# Patient Record
Sex: FEMALE | Race: WHITE | NOT HISPANIC OR LATINO | Employment: UNEMPLOYED | URBAN - METROPOLITAN AREA
[De-identification: names, ages, dates, MRNs, and addresses within clinical notes are randomized per-mention and may not be internally consistent; named-entity substitution may affect disease eponyms.]

---

## 2017-01-23 ENCOUNTER — HOSPITAL ENCOUNTER (EMERGENCY)
Facility: HOSPITAL | Age: 37
Discharge: HOME | End: 2017-01-23
Attending: EMERGENCY MEDICINE

## 2017-01-23 VITALS
SYSTOLIC BLOOD PRESSURE: 113 MMHG | WEIGHT: 223.38 LBS | HEART RATE: 88 BPM | BODY MASS INDEX: 45.03 KG/M2 | HEIGHT: 59 IN | RESPIRATION RATE: 16 BRPM | DIASTOLIC BLOOD PRESSURE: 72 MMHG | OXYGEN SATURATION: 97 % | TEMPERATURE: 99 F

## 2017-01-23 DIAGNOSIS — R10.9 ABDOMINAL PAIN: ICD-10-CM

## 2017-01-23 DIAGNOSIS — Z3A.10 10 WEEKS GESTATION OF PREGNANCY: Primary | ICD-10-CM

## 2017-01-23 LAB
ALBUMIN SERPL BCP-MCNC: 3.7 G/DL
ALP SERPL-CCNC: 62 U/L
ALT SERPL W/O P-5'-P-CCNC: 16 U/L
ANION GAP SERPL CALC-SCNC: 14 MMOL/L
AST SERPL-CCNC: 28 U/L
B-HCG UR QL: POSITIVE
BASOPHILS # BLD AUTO: 0.02 K/UL
BASOPHILS NFR BLD: 0.2 %
BILIRUB SERPL-MCNC: 0.5 MG/DL
BILIRUB UR QL STRIP: NEGATIVE
BUN SERPL-MCNC: 8 MG/DL
CALCIUM SERPL-MCNC: 9.6 MG/DL
CHLORIDE SERPL-SCNC: 104 MMOL/L
CLARITY UR REFRACT.AUTO: CLEAR
CO2 SERPL-SCNC: 19 MMOL/L
COLOR UR AUTO: YELLOW
CREAT SERPL-MCNC: 0.7 MG/DL
DIFFERENTIAL METHOD: ABNORMAL
EOSINOPHIL # BLD AUTO: 0.1 K/UL
EOSINOPHIL NFR BLD: 0.9 %
ERYTHROCYTE [DISTWIDTH] IN BLOOD BY AUTOMATED COUNT: 13.5 %
EST. GFR  (AFRICAN AMERICAN): >60 ML/MIN/1.73 M^2
EST. GFR  (NON AFRICAN AMERICAN): >60 ML/MIN/1.73 M^2
GLUCOSE SERPL-MCNC: 91 MG/DL
GLUCOSE UR QL STRIP: NEGATIVE
HCT VFR BLD AUTO: 39.9 %
HGB BLD-MCNC: 14 G/DL
HGB UR QL STRIP: NEGATIVE
KETONES UR QL STRIP: ABNORMAL
LEUKOCYTE ESTERASE UR QL STRIP: NEGATIVE
LYMPHOCYTES # BLD AUTO: 1.7 K/UL
LYMPHOCYTES NFR BLD: 14.1 %
MCH RBC QN AUTO: 29 PG
MCHC RBC AUTO-ENTMCNC: 35.1 %
MCV RBC AUTO: 83 FL
MONOCYTES # BLD AUTO: 0.9 K/UL
MONOCYTES NFR BLD: 7.6 %
NEUTROPHILS # BLD AUTO: 9.5 K/UL
NEUTROPHILS NFR BLD: 76.7 %
NITRITE UR QL STRIP: NEGATIVE
PH UR STRIP: 6 [PH] (ref 5–8)
PLATELET # BLD AUTO: 346 K/UL
PMV BLD AUTO: 9.9 FL
POTASSIUM SERPL-SCNC: 4.5 MMOL/L
PROT SERPL-MCNC: 8.3 G/DL
PROT UR QL STRIP: NEGATIVE
RBC # BLD AUTO: 4.83 M/UL
SODIUM SERPL-SCNC: 137 MMOL/L
SP GR UR STRIP: <=1.005 (ref 1–1.03)
URN SPEC COLLECT METH UR: ABNORMAL
UROBILINOGEN UR STRIP-ACNC: NEGATIVE EU/DL
WBC # BLD AUTO: 12.3 K/UL

## 2017-01-23 PROCEDURE — 81025 URINE PREGNANCY TEST: CPT

## 2017-01-23 PROCEDURE — 25000003 PHARM REV CODE 250: Performed by: PHYSICIAN ASSISTANT

## 2017-01-23 PROCEDURE — 81003 URINALYSIS AUTO W/O SCOPE: CPT

## 2017-01-23 PROCEDURE — 80053 COMPREHEN METABOLIC PANEL: CPT

## 2017-01-23 PROCEDURE — 85025 COMPLETE CBC W/AUTO DIFF WBC: CPT

## 2017-01-23 PROCEDURE — 99284 EMERGENCY DEPT VISIT MOD MDM: CPT

## 2017-01-23 RX ORDER — ACETAMINOPHEN 325 MG/1
975 TABLET ORAL
Status: COMPLETED | OUTPATIENT
Start: 2017-01-23 | End: 2017-01-23

## 2017-01-23 RX ADMIN — ACETAMINOPHEN 975 MG: 325 TABLET ORAL at 07:01

## 2017-01-23 NOTE — ED AVS SNAPSHOT
[facility]  [address] [address] [address]               April Dafne   2017  4:35 PM   ED    Description:  Female : 1980   Department:  [facility]           Your Care was Coordinated By:     Provider Role From To    Joseph Yun MD Attending Provider 17 1631 17 1640    KARISSA Vazquez Physician Assistant 17 1640 --      Reason for Visit     Abdominal Pain           Diagnoses this Visit        Comments    10 weeks gestation of pregnancy    -  Primary     Abdominal pain           ED Disposition     None           To Do List           Follow-up Information     Follow up with Von Rios MD In 2 days.    Specialty:  Pediatrics    Why:  As needed, If symptoms worsen return to ED     Contact information:    [address] [facility] [address]  [phone]        [facility] On Call     [facility] On Call Nurse Care Line -  Assistance  Registered nurses in the [facility] provide clinical advisement, health education, appointment booking, and other advisory services.  Call for this free service at [phone].             Medications           Message regarding Medications     Verify the changes and/or additions to your medication regime listed below are the same as discussed with your clinician today.  If any of these changes or additions are incorrect, please notify your healthcare provider.        These medications were administered today        Dose Freq    acetaminophen tablet 975 mg 975 mg ED 1 Time    Sig: Take 3 tablets (975 mg total) by mouth ED 1 Time.    Class: Normal    Route: Oral    Cosign for Ordering: Required by [facility]       STOP taking these medications     PNV #10-iron [id number] 30-1-310.1 mg Cap Take 1 capsule by mouth once daily.    oxycodone-acetaminophen (PERCOCET)  mg per tablet Take 1 tablet by mouth every 4 (four) hours  "as needed.    nicotine (NICODERM CQ) 21 mg/24 hr Place 1 patch onto the skin once daily.    naproxen (NAPROSYN) 500 MG tablet Take 1 tablet (500 mg total) by mouth every 8 (eight) hours as needed (cramping or mild pain 1-3/10 scale).           Verify that the below list of medications is an accurate representation of the medications you are currently taking.  If none reported, the list may be blank. If incorrect, please contact your healthcare provider. Carry this list with you in case of emergency.           Current Medications            Clinical Reference Information           Your Vitals Were     BP Pulse Temp Resp Height Weight    115/67 (BP Location: Left arm, Patient Position: Sitting, BP Method: Automatic) 91 99 °F (37.2 °C) (Oral) 18 4' 11" (1.499 m) 101.3 kg (223 lb 6.4 oz)    Last Period SpO2 BMI          10/10/2016 (Exact Date) 100% 45.12 kg/m2        Allergies as of 1/23/2017     No Known Allergies      Immunizations Administered on Date of Encounter - 1/23/2017     None      ED Micro, Lab, POCT     Start Ordered       Status Ordering Provider    01/23/17 1656 01/23/17 1655  Urinalysis  STAT      Final result     01/23/17 1656 01/23/17 1655  Pregnancy, urine rapid (UPT)  STAT      Final result     01/23/17 1656 01/23/17 1655  CBC auto differential  STAT      Final result     01/23/17 1656 01/23/17 1655  Comprehensive metabolic panel  STAT      Final result       ED Imaging Orders     Start Ordered       Status Ordering Provider    01/23/17 1655 01/23/17 1655  US Abdomen Complete  1 time imaging      Final result         Discharge Instructions         Abdominal Pain and Early Pregnancy    (To rule out ectopic pregnancy or miscarriage)  Our tests show that you are pregnant, but the exact cause of your pain isnt clear.  Some pain and bleeding are common early in pregnancy. Often they stop, and you can go on to have a normal pregnancy and baby. Other times the pain or bleeding can be signs of " a miscarriage or ectopic pregnancy. An ectopic pregnancy is a very serious problem. At this time it is unclear if your pregnancy will continue normally, if you will have a miscarriage, or if you could have an ectopic pregnancy. Below is some information about this.  Miscarriage  At this time we dont know whether you will have a miscarriage, or if things will clear up and your pregnancy will continue normally. We understand that this is emotionally difficult. There is little we can say to change the way you feel. But understand that miscarriages are common.  About 1 or 2 out of every 10 pregnancies end this way. Some end even before you know you are pregnant. This happens for a number of reasons, and usually we never figure out why. Its important you know that it is not your fault. It didnt happen because you did anything wrong.  Having sex or exercising does not cause a miscarriage. These activities are usually safe unless you have pain or bleeding or your doctor tells you to stop. Even minor falls wont cause a miscarriage. Miscarriages happen because things were not developing as they were supposed to. No medicine can prevent a miscarriage.  Ectopic pregnancy  In a normal pregnancy, the fertilized egg attaches to the wall of the womb (uterus). In an ectopic or tubal pregnancy, the fertilized egg attaches outside the uterus, usually in the fallopian tube. Very rarely, the egg attaches to an ovary or somewhere else in the abdomen. An ectopic pregnancy is much less common than a miscarriage, but it is very serious. The baby cannot survive, and as it grows it can rupture the tube. This can cause internal bleeding and even death. Risk factors for an ectopic are:  · An ectopic pregnancy in the past  · Pelvic inflammatory disease, or PID  · Endometriosis  · Smoking  · An IUD  Additional tests  Because we dont know whats causing your symptoms, you will need more tests to help your doctor figure out what the problem  is. You may need:  Ultrasound  An ultrasound can usually find a normal pregnancy as early as 4 to 5 weeks along. If the ultrasound does not show the baby inside the uterus, it means that:  · You have a normal pregnancy less than 4 weeks along, or  · You are having or recently had a miscarriage, or  · You have an ectopic pregnancy  Quantitative HCG  This test measures the amount of a pregnancy hormone in your blood. Comparing today's test result to a repeat test in 2 days will show whether you have a normal pregnancy.  Laparoscopy  This is a type of surgery. The doctor will put a tube with a light inside your belly (abdomen) to look directly at your pelvic organs. This test is used when it is not safe to wait 2 days for blood test results.     Important information  If you do have an ectopic pregnancy, there is a small chance that the growing fetus can tear the fallopian tube. This can cause severe internal bleeding. If this happens, you may have:  · Sudden severe pain in your lower abdomen  · Vaginal bleeding  · Weakness, dizziness, and sometimes fainting  If any of these symptoms occur:  · Call 911 or return immediately to the hospital.  · Do not drive yourself.  · Do not go to your doctor's office or to a clinic - go to the hospital.      Home care  Follow these guidelines to help care for yourself at home:  · Rest until your next exam. Dont do anything strenuous.  · Eat a light diet with foods that are easy to digest.  · Dont have sex until your doctor says its OK.  Follow-up care  Follow up with your doctor for repeat blood testing. If you were told to have a repeat blood test in 2 days, its important to get it done.  If you had an X-ray or ultrasound, a radiologist will review it. You will be told of any new findings that may affect your care.  Call 911  Call 911 if you have any of these:  · Severe pain and very heavy bleeding  · Severe lightheadedness, passing out, or fainting  · Rapid heart  rate  · Trouble breathing  · Confused or difficulty waking up  When to seek medical advice  Call your health care provider right away  if any of these occur:  · The pain in your abdomen gets worse, either suddenly or gradually.  · You are dizzy or weak when you stand.  · You have heavy vaginal bleeding. This means soaking 1 pad an hour for 3 hours.  · You have vaginal bleeding for more than 5 days.  · You have repeated vomiting or diarrhea.  · The pain in your abdomen moves to the lower right.  · You have blood in your vomit or bowel movements. This will be dark red or black.  · You have a fever of 100.4ºF (38ºC) or higher, or as directed by your health care provider  © 5369-0221 [employer]. [address] [place] [address], [place] [address]. All rights reserved. This information is not intended as a substitute for professional medical care. Always follow your healthcare professional's instructions.          [facility] Sign-Up     Activating your [facility] account is as easy as 1-2-3!     1) Visit [email or web address], select Sign Up Now, enter this activation code and your date of birth, then select Next.  Activation code not generated  Current Patient Portal Status: Account disabled      2) Create a username and password to use when you visit [facility] in the future and select a security question in case you lose your password and select Next.    3) Enter your e-mail address and click Sign Up!    Additional Information  If you have questions, please e-mail [email or web address] or call [phone] to talk to our [facility] staff. Remember, [facility] is NOT to be used for urgent needs. For medical emergencies, dial 911.         Smoking Cessation     If you would like to quit smoking:   You may be eligible for free services if you are a [place] resident and started smoking cigarettes before September 1, 1988.  Call the Smoking Cessation Trust (SCT) toll free at (917) 219-2517 or (921) 850-6973.   Call  1-800-QUIT-NOW if you do not meet the above criteria.             [facility] complies with applicable Federal civil rights laws and does not discriminate on the basis of race, color, national origin, age, disability, or sex.        Language Assistance Services     ATTENTION: Language assistance services are available, free of charge. Please call [phone].      ATENCIÓN: Si habla español, tiene a modi disposición servicios gratuitos de asistencia lingüística. Llame al [phone].     CHÚ Ý: N?u b?n nói Ti?ng Vi?t, có các d?ch v? h? tr? ngôn ng? mi?n phí dành cho b?n. G?i s? [phone].

## 2017-01-23 NOTE — ED PROVIDER NOTES
Encounter Date: 2017       History     Chief Complaint   Patient presents with    Abdominal Pain     x2 days ago. Pt states she is 15 wks preg. SONA 17. Started having pain/pressure. 8th pregnancy. States she's never felt this type of pain. HX 7 C sections, excessive scar tissue. Denies bleeding/discharge. Has not yet seen OBGYN.      Review of patient's allergies indicates:  No Known Allergies  HPI Comments: Pt reports to ED c/o abdominal pain. Pt reports pain has been ongoing for 2 days, but has gotten increasingly worse. Pt is currently 15 weeks pregnant and has not had any prenatal care d/t her insurance.  Pt is A0 and all of her previous pregnancies resulted in C-sections. 16 months ago, following the birth of her last child, pt had some complications immediately after being discharged from [facility] and was found to have an incarcerated ventral hernia, which was repaired with a pelvic mesh.  Pt says she has never had pain like this before with any of her pregnancies.  Pain is constant with no alleviating factors, but is exacerbated with any movement. Pain is located to lower abdomen and radiates to epigastric area.  Pt says pain feels like contractions. Pt denies any bleeding, vaginal pain, dysuria, back pain, CP, SOB, fever, nausea, vomiting, or any other associated sxs.  Pt's LBM was this morning and pt denies any bloody or tar like stool.      The history is provided by the patient.     Past Medical History   Diagnosis Date    Seizures      No past medical history pertinent negatives.  Past Surgical History   Procedure Laterality Date     section       x 6     section      Tonsillectomy      Hernia repair       History reviewed. No pertinent family history.  Social History   Substance Use Topics    Smoking status: Current Every Day Smoker     Packs/day: 0.50     Types: Cigarettes    Smokeless tobacco: Never Used    Alcohol use No     Review of Systems   Constitutional:  Negative for fever.   HENT: Negative for sore throat.    Respiratory: Negative for shortness of breath.    Cardiovascular: Negative for chest pain.   Gastrointestinal: Negative for anal bleeding, constipation, diarrhea, nausea and vomiting.   Genitourinary: Negative for decreased urine volume, dysuria, flank pain, pelvic pain, vaginal bleeding, vaginal discharge and vaginal pain.   Musculoskeletal: Negative for back pain.   Skin: Negative for rash.   Neurological: Negative for weakness.   Hematological: Does not bruise/bleed easily.   All other systems reviewed and are negative.      Physical Exam   Initial Vitals   BP Pulse Resp Temp SpO2   01/23/17 1619 01/23/17 1619 01/23/17 1619 01/23/17 1619 01/23/17 1619   109/64 97 18 97.7 °F (36.5 °C) 98 %     Physical Exam    Nursing note and vitals reviewed.  Constitutional: She appears well-developed and well-nourished.   HENT:   Head: Normocephalic and atraumatic.   Eyes: EOM are normal. Pupils are equal, round, and reactive to light.   Neck: Normal range of motion. Neck supple.   Cardiovascular: Normal rate and regular rhythm.   Pulmonary/Chest: Breath sounds normal.   Abdominal: Soft. She exhibits no distension. There is tenderness. There is no rebound and no guarding.   Musculoskeletal: Normal range of motion.   Neurological: She is alert and oriented to person, place, and time.   Skin:   Significant scaring to lower abdomen from previous c-sections and surgical repair of hernia.          ED Course   Procedures  Labs Reviewed   URINALYSIS - Abnormal; Notable for the following:        Result Value    Ketones, UA Trace (*)     All other components within normal limits   CBC W/ AUTO DIFFERENTIAL - Abnormal; Notable for the following:     Gran # 9.5 (*)     Gran% 76.7 (*)     Lymph% 14.1 (*)     All other components within normal limits   COMPREHENSIVE METABOLIC PANEL - Abnormal; Notable for the following:     CO2 19 (*)     All other components within normal limits    PREGNANCY TEST, URINE RAPID        Imaging Results         US Abdomen Complete (Final result) Result time:  01/23/17 19:12:14    Final result by Yane Souza MD (Timothy) (01/23/17 19:12:14)    Impression:     Viable intrauterine pregnancy with estimated sonographic age of 10 weeks and 4 days.  Fetal heart rate 163 beats per minute.    No abnormalities of the upper abdomen.      Electronically signed by: YANE SOUZA MD  Date:     01/23/17  Time:    19:12     Narrative:    Ultrasound Abdomen Complete with doppler.    Clinical History:     <Abdominal pain.>    Findings:     The liver, spleen, and pancreas are normal. <There is normal hepatopetal flow by spectral doppler and color flow vascular ultrasound.>     The kidneys appear normal.  No hydronephrosis.    The gallbladder and common bile duct appears normal. The common bile duct measures 3 mm.    Visualized portions of the aorta and inferior vena cava appear normal.    There is an intrauterine pregnancy identified.  Crown-rump length of 3.3 cm corresponding to gestational age of 10 weeks and 4 days.  Fetal heart rate 163 beats per minute.            Results for orders placed or performed during the hospital encounter of 01/23/17   Urinalysis   Result Value Ref Range    Specimen UA Urine, Clean Catch     Color, UA Yellow Yellow, Straw, Emilee    Appearance, UA Clear Clear    pH, UA 6.0 5.0 - 8.0    Specific Gravity, UA <=1.005 1.005 - 1.030    Protein, UA Negative Negative    Glucose, UA Negative Negative    Ketones, UA Trace (A) Negative    Bilirubin (UA) Negative Negative    Occult Blood UA Negative Negative    Nitrite, UA Negative Negative    Urobilinogen, UA Negative <2.0 EU/dL    Leukocytes, UA Negative Negative   Pregnancy, urine rapid (UPT)   Result Value Ref Range    Preg Test, Ur Positive    CBC auto differential   Result Value Ref Range    WBC 12.30 3.90 - 12.70 K/uL    RBC 4.83 4.00 - 5.40 M/uL    Hemoglobin 14.0 12.0 - 16.0 g/dL    Hematocrit 39.9  37.0 - 48.5 %    MCV 83 82 - 98 fL    MCH 29.0 27.0 - 31.0 pg    MCHC 35.1 32.0 - 36.0 %    RDW 13.5 11.5 - 14.5 %    Platelets 346 150 - 350 K/uL    MPV 9.9 9.2 - 12.9 fL    Gran # 9.5 (H) 1.8 - 7.7 K/uL    Lymph # 1.7 1.0 - 4.8 K/uL    Mono # 0.9 0.3 - 1.0 K/uL    Eos # 0.1 0.0 - 0.5 K/uL    Baso # 0.02 0.00 - 0.20 K/uL    Gran% 76.7 (H) 38.0 - 73.0 %    Lymph% 14.1 (L) 18.0 - 48.0 %    Mono% 7.6 4.0 - 15.0 %    Eosinophil% 0.9 0.0 - 8.0 %    Basophil% 0.2 0.0 - 1.9 %    Differential Method Automated    Comprehensive metabolic panel   Result Value Ref Range    Sodium 137 136 - 145 mmol/L    Potassium 4.5 3.5 - 5.1 mmol/L    Chloride 104 95 - 110 mmol/L    CO2 19 (L) 23 - 29 mmol/L    Glucose 91 70 - 110 mg/dL    BUN, Bld 8 6 - 20 mg/dL    Creatinine 0.7 0.5 - 1.4 mg/dL    Calcium 9.6 8.7 - 10.5 mg/dL    Total Protein 8.3 6.0 - 8.4 g/dL    Albumin 3.7 3.5 - 5.2 g/dL    Total Bilirubin 0.5 0.1 - 1.0 mg/dL    Alkaline Phosphatase 62 55 - 135 U/L    AST 28 10 - 40 U/L    ALT 16 10 - 44 U/L    Anion Gap 14 8 - 16 mmol/L    eGFR if African American >60.0 >60 mL/min/1.73 m^2    eGFR if non African American >60.0 >60 mL/min/1.73 m^2                            ED Course     Clinical Impression:   The primary encounter diagnosis was 10 weeks gestation of pregnancy. A diagnosis of Abdominal pain was also pertinent to this visit.          KARISSA Vazquez  01/23/17 2023

## 2017-01-24 NOTE — ED NOTES
Pt given test results and poc by KARISSA Palacios and myself. She has verbalized understanding. Pt denies having any further questions or concerns at this time. Pt will be discharged at this time per md order.

## 2017-01-24 NOTE — DISCHARGE INSTRUCTIONS
Abdominal Pain and Early Pregnancy    (To rule out ectopic pregnancy or miscarriage)  Our tests show that you are pregnant, but the exact cause of your pain isnt clear.  Some pain and bleeding are common early in pregnancy. Often they stop, and you can go on to have a normal pregnancy and baby. Other times the pain or bleeding can be signs of a miscarriage or ectopic pregnancy. An ectopic pregnancy is a very serious problem. At this time it is unclear if your pregnancy will continue normally, if you will have a miscarriage, or if you could have an ectopic pregnancy. Below is some information about this.  Miscarriage  At this time we dont know whether you will have a miscarriage, or if things will clear up and your pregnancy will continue normally. We understand that this is emotionally difficult. There is little we can say to change the way you feel. But understand that miscarriages are common.  About 1 or 2 out of every 10 pregnancies end this way. Some end even before you know you are pregnant. This happens for a number of reasons, and usually we never figure out why. Its important you know that it is not your fault. It didnt happen because you did anything wrong.  Having sex or exercising does not cause a miscarriage. These activities are usually safe unless you have pain or bleeding or your doctor tells you to stop. Even minor falls wont cause a miscarriage. Miscarriages happen because things were not developing as they were supposed to. No medicine can prevent a miscarriage.  Ectopic pregnancy  In a normal pregnancy, the fertilized egg attaches to the wall of the womb (uterus). In an ectopic or tubal pregnancy, the fertilized egg attaches outside the uterus, usually in the fallopian tube. Very rarely, the egg attaches to an ovary or somewhere else in the abdomen. An ectopic pregnancy is much less common than a miscarriage, but it is very serious. The baby cannot survive, and as it grows it can rupture  the tube. This can cause internal bleeding and even death. Risk factors for an ectopic are:  · An ectopic pregnancy in the past  · Pelvic inflammatory disease, or PID  · Endometriosis  · Smoking  · An IUD  Additional tests  Because we dont know whats causing your symptoms, you will need more tests to help your doctor figure out what the problem is. You may need:  Ultrasound  An ultrasound can usually find a normal pregnancy as early as 4 to 5 weeks along. If the ultrasound does not show the baby inside the uterus, it means that:  · You have a normal pregnancy less than 4 weeks along, or  · You are having or recently had a miscarriage, or  · You have an ectopic pregnancy  Quantitative HCG  This test measures the amount of a pregnancy hormone in your blood. Comparing today's test result to a repeat test in 2 days will show whether you have a normal pregnancy.  Laparoscopy  This is a type of surgery. The doctor will put a tube with a light inside your belly (abdomen) to look directly at your pelvic organs. This test is used when it is not safe to wait 2 days for blood test results.     Important information  If you do have an ectopic pregnancy, there is a small chance that the growing fetus can tear the fallopian tube. This can cause severe internal bleeding. If this happens, you may have:  · Sudden severe pain in your lower abdomen  · Vaginal bleeding  · Weakness, dizziness, and sometimes fainting  If any of these symptoms occur:  · Call 911 or return immediately to the hospital.  · Do not drive yourself.  · Do not go to your doctor's office or to a clinic - go to the hospital.      Home care  Follow these guidelines to help care for yourself at home:  · Rest until your next exam. Dont do anything strenuous.  · Eat a light diet with foods that are easy to digest.  · Dont have sex until your doctor says its OK.  Follow-up care  Follow up with your doctor for repeat blood testing. If you were told to have a repeat  blood test in 2 days, its important to get it done.  If you had an X-ray or ultrasound, a radiologist will review it. You will be told of any new findings that may affect your care.  Call 911  Call 911 if you have any of these:  · Severe pain and very heavy bleeding  · Severe lightheadedness, passing out, or fainting  · Rapid heart rate  · Trouble breathing  · Confused or difficulty waking up  When to seek medical advice  Call your health care provider right away  if any of these occur:  · The pain in your abdomen gets worse, either suddenly or gradually.  · You are dizzy or weak when you stand.  · You have heavy vaginal bleeding. This means soaking 1 pad an hour for 3 hours.  · You have vaginal bleeding for more than 5 days.  · You have repeated vomiting or diarrhea.  · The pain in your abdomen moves to the lower right.  · You have blood in your vomit or bowel movements. This will be dark red or black.  · You have a fever of 100.4ºF (38ºC) or higher, or as directed by your health care provider  © 8149-1486 [email or web address]. [address] [place] [address], [place] [address]. All rights reserved. This information is not intended as a substitute for professional medical care. Always follow your healthcare professional's instructions.

## 2017-01-24 NOTE — ED NOTES
"Pt is sitting up on ER stretcher, aaoX4, resp e/u, rates pain 10, VSS, nad. Pt came to ER for lower abdominal pain radiating to the epigastric region that began approximately 2 days ago, and she states that the pain is constantly increasing. Pt is currently approximately 15 weeks pregnant and has not had any prenatal care. Pt is A0.  Pt states that all of her previous pregnancies resulted in C-sections. After the birth of her last child 16 months ago, pt had an incarcerated ventral hernia after being discharged from L&D. Pt's hernia was repaired with a pelvic mesh. Pt denies ever having pain similar to this episodes and describes pain as "feeling like contractions." Pt denies any vaginal bleeding, discharge, or any other symptoms at this time. Pt updated on poc and has verbalized understanding. Bed locked in lowest position, side rails up X2, call bell in reach. Will continue to monitor.   "

## 2017-02-05 ENCOUNTER — HOSPITAL ENCOUNTER (EMERGENCY)
Facility: HOSPITAL | Age: 37
Discharge: HOME | End: 2017-02-05
Attending: INTERNAL MEDICINE

## 2017-02-05 VITALS
RESPIRATION RATE: 19 BRPM | SYSTOLIC BLOOD PRESSURE: 114 MMHG | HEIGHT: 59 IN | DIASTOLIC BLOOD PRESSURE: 79 MMHG | BODY MASS INDEX: 46.24 KG/M2 | WEIGHT: 229.38 LBS | TEMPERATURE: 99 F | OXYGEN SATURATION: 97 % | HEART RATE: 95 BPM

## 2017-02-05 DIAGNOSIS — O20.0 THREATENED MISCARRIAGE: Primary | ICD-10-CM

## 2017-02-05 LAB
ABO + RH BLD: NORMAL
BASOPHILS # BLD AUTO: 0.02 K/UL
BASOPHILS NFR BLD: 0.2 %
BLD GP AB SCN CELLS X3 SERPL QL: NORMAL
DIFFERENTIAL METHOD: NORMAL
EOSINOPHIL # BLD AUTO: 0.3 K/UL
EOSINOPHIL NFR BLD: 4 %
ERYTHROCYTE [DISTWIDTH] IN BLOOD BY AUTOMATED COUNT: 13.6 %
HCG INTACT+B SERPL-ACNC: NORMAL MIU/ML
HCG INTACT+B SERPL-ACNC: NORMAL MIU/ML
HCT VFR BLD AUTO: 38.5 %
HGB BLD-MCNC: 13.3 G/DL
LYMPHOCYTES # BLD AUTO: 1.7 K/UL
LYMPHOCYTES NFR BLD: 20.6 %
MCH RBC QN AUTO: 28.7 PG
MCHC RBC AUTO-ENTMCNC: 34.5 %
MCV RBC AUTO: 83 FL
MONOCYTES # BLD AUTO: 0.6 K/UL
MONOCYTES NFR BLD: 7.1 %
NEUTROPHILS # BLD AUTO: 5.7 K/UL
NEUTROPHILS NFR BLD: 67.4 %
PLATELET # BLD AUTO: 289 K/UL
PMV BLD AUTO: 9.5 FL
RBC # BLD AUTO: 4.63 M/UL
WBC # BLD AUTO: 8.44 K/UL

## 2017-02-05 PROCEDURE — 85025 COMPLETE CBC W/AUTO DIFF WBC: CPT

## 2017-02-05 PROCEDURE — 86900 BLOOD TYPING SEROLOGIC ABO: CPT

## 2017-02-05 PROCEDURE — 84702 CHORIONIC GONADOTROPIN TEST: CPT

## 2017-02-05 PROCEDURE — 99284 EMERGENCY DEPT VISIT MOD MDM: CPT

## 2017-02-05 PROCEDURE — 86901 BLOOD TYPING SEROLOGIC RH(D): CPT

## 2017-02-05 RX ORDER — ACETAMINOPHEN 500 MG
1000 TABLET ORAL 3 TIMES DAILY PRN
Qty: 30 TABLET | Refills: 0 | Status: ON HOLD
Start: 2017-02-05 | End: 2018-07-04 | Stop reason: HOSPADM

## 2017-02-05 NOTE — ED PROVIDER NOTES
Encounter Date: 2017       History   Pt presents with some vaginal spotting today, states also saw a small blood clot. Pt is 12 weeks pregnant, no prenatal care. . States some discomfort on her RLQ. Denies fever, nausea, diarrhea, or recent trauma.  Chief Complaint   Patient presents with    Vaginal Bleeding     12 weeks preg. spotting. pt denies pain at this time     Review of patient's allergies indicates:  No Known Allergies  The history is provided by the patient.     Past Medical History   Diagnosis Date    Seizures      No past medical history pertinent negatives.  Past Surgical History   Procedure Laterality Date     section       x 6     section      Tonsillectomy      Hernia repair       History reviewed. No pertinent family history.  Social History   Substance Use Topics    Smoking status: Current Every Day Smoker     Packs/day: 0.50     Types: Cigarettes    Smokeless tobacco: Never Used    Alcohol use No     Review of Systems   Constitutional: Negative for appetite change, chills and fever.   HENT: Negative for dental problem, ear pain and sore throat.    Eyes: Negative for visual disturbance.   Respiratory: Negative for cough and shortness of breath.    Cardiovascular: Negative for chest pain and palpitations.   Gastrointestinal: Negative for abdominal pain, blood in stool, diarrhea and vomiting.   Genitourinary: Positive for pelvic pain and vaginal bleeding. Negative for dysuria and vaginal discharge.   Musculoskeletal: Negative for back pain and myalgias.   Skin: Negative for rash.   Neurological: Negative for weakness and headaches.   Psychiatric/Behavioral: Negative for confusion and sleep disturbance.   All other systems reviewed and are negative.      Physical Exam   Initial Vitals   BP Pulse Resp Temp SpO2   17 1004 17 1004 17 1004 17 1004 17 1004   114/79 95 19 98.6 °F (37 °C) 97 %     Physical Exam    Nursing note and vitals  reviewed.  Constitutional: She appears well-developed and well-nourished. No distress.   HENT:   Head: Normocephalic and atraumatic.   Mouth/Throat: Oropharynx is clear and moist.   Eyes: Conjunctivae are normal. Pupils are equal, round, and reactive to light.   Neck: Normal range of motion. Neck supple.   Cardiovascular: Normal rate, regular rhythm, normal heart sounds and intact distal pulses.   Pulmonary/Chest: Breath sounds normal.   Abdominal: Soft. Bowel sounds are normal. She exhibits no distension. There is no tenderness. There is no rebound and no guarding.   Genitourinary: Vagina normal.   Genitourinary Comments: No blood from os, closed cervix, no discharge   Musculoskeletal: Normal range of motion. She exhibits no edema.   Neurological: She is alert and oriented to person, place, and time. She has normal strength.   Skin: Skin is warm and dry. No rash noted.   Psychiatric: Her behavior is normal.         ED Course   US - ED Performed - OB Limited 1 or More Gestations  Date/Time: 2/5/2017 11:31 AM  Performed by: CEE LEIGH.  Authorized by: CEE LEIGH.   Comments: Bedside US performed by me reveal an IUP with adequate fetal heart flickering and fetal movement; No free fluid        Labs Reviewed   CBC W/ AUTO DIFFERENTIAL   HCG, QUANTITATIVE, PREGNANCY   HCG, QUANTITATIVE, PREGNANCY   TYPE & SCREEN         Results for orders placed or performed during the hospital encounter of 02/05/17   CBC auto differential   Result Value Ref Range    WBC 8.44 3.90 - 12.70 K/uL    RBC 4.63 4.00 - 5.40 M/uL    Hemoglobin 13.3 12.0 - 16.0 g/dL    Hematocrit 38.5 37.0 - 48.5 %    MCV 83 82 - 98 fL    MCH 28.7 27.0 - 31.0 pg    MCHC 34.5 32.0 - 36.0 %    RDW 13.6 11.5 - 14.5 %    Platelets 289 150 - 350 K/uL    MPV 9.5 9.2 - 12.9 fL    Gran # 5.7 1.8 - 7.7 K/uL    Lymph # 1.7 1.0 - 4.8 K/uL    Mono # 0.6 0.3 - 1.0 K/uL    Eos # 0.3 0.0 - 0.5 K/uL    Baso # 0.02 0.00 - 0.20 K/uL    Gran% 67.4  38.0 - 73.0 %    Lymph% 20.6 18.0 - 48.0 %    Mono% 7.1 4.0 - 15.0 %    Eosinophil% 4.0 0.0 - 8.0 %    Basophil% 0.2 0.0 - 1.9 %    Differential Method Automated    hCG, quantitative, pregnancy   Result Value Ref Range    hCG Quant 50668 See Text mIU/mL   hCG, quantitative   Result Value Ref Range    hCG Quant 77361 See Text mIU/mL                              ED Course     Clinical Impression:   The encounter diagnosis was Threatened miscarriage.    Disposition:   Disposition: Discharged  Condition: Stable       Cody Thorne MD  02/05/17 0392

## 2017-02-05 NOTE — DISCHARGE INSTRUCTIONS
Possible Miscarriage (Threatened )  You may be having a miscarriage.  Common signs of a miscarriage are pain and bleeding. A small amount of bleeding can be normal during the first 3 months of pregnancy. Often the pain and bleeding stop, and you have a normal pregnancy and baby. But heavy bleeding or severe cramping can be an early sign of miscarriage. A miscarriage means an unexpected loss of your pregnancy.  At this time, your healthcare provider doesnt know whether you will have a miscarriage, or if things will clear up and your pregnancy will continue normally. This can be emotionally difficult. There is little that can be done to change the way you feel. But understand that miscarriages are common.  About 1 or 2 out of every 10 pregnancies end this way. Some even end before you know you are pregnant. This happens for a number of reasons, and usually the cause is never known. Its important you know that it is not your fault. It didnt happen because you did anything wrong.  Having sex or exercising does not cause a miscarriage. These activities are usually safe unless you have pain or bleeding or your doctor tells you to stop. Even minor falls wont cause a miscarriage. Miscarriages happen because things were not developing as they were supposed to. No medicine can prevent a miscarriage.  Again, understand that things are uncertain right now. You may still have some bleeding. This may be light spotting or like a period, and you may pass some tissue. You may have some cramping. This is why follow-up care is important.  Home care  To improve the chance of keeping your pregnancy, you should take these steps:  · Rest in bed until the pain and bleeding stop.  · Dont have sex until your healthcare provider says its OK.  · Use sanitary napkins instead of tampons.  · Dont douche.  · Dont take aspirin, ibuprofen, or naproxen.  · Dont have alcoholic or caffeinated beverages or smoke.  Follow-up  care  Make an appointment with your doctor within the next week, or as directed.  If you had an ultrasound, a radiologist will review it. You will be told of any new findings that may affect your care.  Call 911  Call 911 if you have:  · Severe pain and very heavy bleeding  · Severe lightheadedness, passing out, or fainting  · Rapid heart rate  · Difficulty breathing  · Confusion or difficulty waking up  When to seek medical advice  Call your healthcare provider right away if any of these occur:  · Vaginal bleeding or pain that lasts for more than 3 days  · Heavy bleeding. This means soaking 1 new pad an hour over 3 hours.  · Fever of 100.4°F (38°C) or higher, or as directed by your healthcare provider  · Pain in your lower belly (abdomen) that gets worse  · Weakness or dizziness  · Passage of anything that resembles tissue. This would be pink or grayish membrane or solid material. Save the tissue in a clean container and bring it to your provider.  Date Last Reviewed: 9/1/2016 © 2000-2016 [employer]. [address] [place] [address], [place] [address]. All rights reserved. This information is not intended as a substitute for professional medical care. Always follow your healthcare professional's instructions.

## 2017-02-05 NOTE — ED AVS SNAPSHOT
[facility]  [address] [address] [address]               April Dafne   2017 10:08 AM   ED    Description:  Female : 1980   Department:  [facility]           Your Care was Coordinated By:     Provider Role From To    Cody Thorne MD Attending Provider 17 1013 --      Reason for Visit     Vaginal Bleeding           Diagnoses this Visit        Comments    Threatened miscarriage    -  Primary       ED Disposition     ED Disposition Condition Comment    Discharge             To Do List           Follow-up Information     Follow up with Von Rios MD In 3 days.    Specialty:  Pediatrics    Contact information:    [address] [facility] [address]  [phone]          Follow up with [facility].    Specialty:  Emergency Medicine    Why:  If symptoms worsen    Contact information:    [address] [address] [address] [phone]        Follow up with Von Rios MD In 1 week(s).    Specialty:  Pediatrics    Contact information:    [address] [facility] [address]  [phone]         These Medications        Disp Refills Start End    acetaminophen (TYLENOL) 500 MG tablet 30 tablet 0 2017     Take 2 tablets (1,000 mg total) by mouth 3 (three) times daily as needed for Pain. - Oral    Pharmacy: [facility] - [address]. Ph #: [phone]         [facility]     [facility] Care [facility] -  Assistance  Registered nurses in the [facility] provide clinical advisement, health education, appointment booking, and other advisory services.  Call for this free service at [phone].             Medications           Message regarding Medications     Verify the changes and/or additions to your medication regime listed below are the same as discussed with your  "clinician today.  If any of these changes or additions are incorrect, please notify your healthcare provider.        START taking these NEW medications        Refills    acetaminophen (TYLENOL) 500 MG tablet 0    Sig: Take 2 tablets (1,000 mg total) by mouth 3 (three) times daily as needed for Pain.    Class: No Print    Route: Oral           Verify that the below list of medications is an accurate representation of the medications you are currently taking.  If none reported, the list may be blank. If incorrect, please contact your healthcare provider. Carry this list with you in case of emergency.           Current Medications     acetaminophen (TYLENOL) 500 MG tablet Take 2 tablets (1,000 mg total) by mouth 3 (three) times daily as needed for Pain.           Clinical Reference Information           Your Vitals Were     BP Pulse Temp Resp Height Weight    114/79 (BP Location: Left arm, Patient Position: Sitting) 95 98.6 °F (37 °C) (Oral) 19 4' 11" (1.499 m) 104 kg (229 lb 6 oz)    Last Period SpO2 BMI          10/10/2016 (Exact Date) 97% 46.33 kg/m2        Allergies as of 2017     No Known Allergies      Immunizations Administered on Date of Encounter - 2017     None      ED Micro, Lab, POCT     Start Ordered       Status Ordering Provider    17 1022 17 1021  hCG, quantitative  Once      Final result     17 1021 17 1020  hCG, quantitative, pregnancy  STAT      Final result     17 1015 17 1014  CBC auto differential  STAT      Final result       ED Imaging Orders     None        Discharge Instructions           Possible Miscarriage (Threatened )  You may be having a miscarriage.  Common signs of a miscarriage are pain and bleeding. A small amount of bleeding can be normal during the first 3 months of pregnancy. Often the pain and bleeding stop, and you have a normal pregnancy and baby. But heavy bleeding or severe cramping can be an early sign of miscarriage. A " miscarriage means an unexpected loss of your pregnancy.  At this time, your healthcare provider doesnt know whether you will have a miscarriage, or if things will clear up and your pregnancy will continue normally. This can be emotionally difficult. There is little that can be done to change the way you feel. But understand that miscarriages are common.  About 1 or 2 out of every 10 pregnancies end this way. Some even end before you know you are pregnant. This happens for a number of reasons, and usually the cause is never known. Its important you know that it is not your fault. It didnt happen because you did anything wrong.  Having sex or exercising does not cause a miscarriage. These activities are usually safe unless you have pain or bleeding or your doctor tells you to stop. Even minor falls wont cause a miscarriage. Miscarriages happen because things were not developing as they were supposed to. No medicine can prevent a miscarriage.  Again, understand that things are uncertain right now. You may still have some bleeding. This may be light spotting or like a period, and you may pass some tissue. You may have some cramping. This is why follow-up care is important.  Home care  To improve the chance of keeping your pregnancy, you should take these steps:  · Rest in bed until the pain and bleeding stop.  · Dont have sex until your healthcare provider says its OK.  · Use sanitary napkins instead of tampons.  · Dont douche.  · Dont take aspirin, ibuprofen, or naproxen.  · Dont have alcoholic or caffeinated beverages or smoke.  Follow-up care  Make an appointment with your doctor within the next week, or as directed.  If you had an ultrasound, a radiologist will review it. You will be told of any new findings that may affect your care.  Call 911  Call 911 if you have:  · Severe pain and very heavy bleeding  · Severe lightheadedness, passing out, or fainting  · Rapid heart rate  · Difficulty  breathing  · Confusion or difficulty waking up  When to seek medical advice  Call your healthcare provider right away if any of these occur:  · Vaginal bleeding or pain that lasts for more than 3 days  · Heavy bleeding. This means soaking 1 new pad an hour over 3 hours.  · Fever of 100.4°F (38°C) or higher, or as directed by your healthcare provider  · Pain in your lower belly (abdomen) that gets worse  · Weakness or dizziness  · Passage of anything that resembles tissue. This would be pink or grayish membrane or solid material. Save the tissue in a clean container and bring it to your provider.  Date Last Reviewed: 9/1/2016 © 2000-2016 [employer]. [address] [place] [address], [place] [address]. All rights reserved. This information is not intended as a substitute for professional medical care. Always follow your healthcare professional's instructions.          Discharge References/Attachments     PREGNANCY, BLEEDING DURING EARLY (ENGLISH)      [facility] Sign-Up     Activating your [facility] account is as easy as 1-2-3!     1) Visit [email or web address], select Sign Up Now, enter this activation code and your date of birth, then select Next.  Activation code not generated  Current Patient Portal Status: Account disabled      2) Create a username and password to use when you visit [facility] in the future and select a security question in case you lose your password and select Next.    3) Enter your e-mail address and click Sign Up!    Additional Information  If you have questions, please e-mail [email or web address] or call [phone] to talk to our [facility] staff. Remember, [facility] is NOT to be used for urgent needs. For medical emergencies, dial 911.         Smoking Cessation     If you would like to quit smoking:   You may be eligible for free services if you are a [place] resident and started smoking cigarettes before September 1, 1988.  Call the Smoking Cessation Trust (SCT) toll free at (794)  425-1959 or (815) 699-9713.   Call 1-800-QUIT-NOW if you do not meet the above criteria.             [facility] complies with applicable Federal civil rights laws and does not discriminate on the basis of race, color, national origin, age, disability, or sex.        Language Assistance Services     ATTENTION: Language assistance services are available, free of charge. Please call [phone].      ATENCIÓN: Si habla español, tiene a modi disposición servicios gratuitos de asistencia lingüística. Llame al [phone].     CHÚ Ý: N?u b?n nói Ti?ng Vi?t, có các d?ch v? h? tr? ngôn ng? mi?n phí dành cho b?n. G?i s? [phone].

## 2018-07-03 ENCOUNTER — HOSPITAL ENCOUNTER (OUTPATIENT)
Facility: HOSPITAL | Age: 38
Discharge: HOME | End: 2018-07-04
Attending: EMERGENCY MEDICINE | Admitting: INTERNAL MEDICINE
Payer: MEDICAID

## 2018-07-03 DIAGNOSIS — R74.01 TRANSAMINITIS: Primary | ICD-10-CM

## 2018-07-03 DIAGNOSIS — F19.10 SUBSTANCE ABUSE: ICD-10-CM

## 2018-07-03 DIAGNOSIS — E80.6 HYPERBILIRUBINEMIA: ICD-10-CM

## 2018-07-03 PROBLEM — R79.89 ELEVATED LFTS: Status: ACTIVE | Noted: 2018-07-03

## 2018-07-03 PROBLEM — B17.9 ACUTE HEPATITIS: Status: ACTIVE | Noted: 2018-07-03

## 2018-07-03 LAB
ALBUMIN SERPL BCP-MCNC: 2.9 G/DL
ALP SERPL-CCNC: 264 U/L
ALT SERPL W/O P-5'-P-CCNC: 1288 U/L
AMPHET+METHAMPHET UR QL: NORMAL
ANION GAP SERPL CALC-SCNC: 10 MMOL/L
APAP SERPL-MCNC: <3 UG/ML
APTT BLDCRRT: 32.1 SEC
AST SERPL-CCNC: 1946 U/L
BARBITURATES UR QL SCN>200 NG/ML: NEGATIVE
BASOPHILS # BLD AUTO: 0.04 K/UL
BASOPHILS NFR BLD: 0.5 %
BENZODIAZ UR QL SCN>200 NG/ML: NEGATIVE
BILIRUB SERPL-MCNC: 12.1 MG/DL
BILIRUB UR QL STRIP: ABNORMAL
BUN SERPL-MCNC: 5 MG/DL
BZE UR QL SCN: NEGATIVE
CALCIUM SERPL-MCNC: 8.8 MG/DL
CANNABINOIDS UR QL SCN: NORMAL
CHLORIDE SERPL-SCNC: 100 MMOL/L
CLARITY UR: CLEAR
CO2 SERPL-SCNC: 25 MMOL/L
COLOR UR: YELLOW
CREAT SERPL-MCNC: 0.6 MG/DL
CREAT UR-MCNC: 121.1 MG/DL
DIFFERENTIAL METHOD: ABNORMAL
EOSINOPHIL # BLD AUTO: 0.2 K/UL
EOSINOPHIL NFR BLD: 2.9 %
ERYTHROCYTE [DISTWIDTH] IN BLOOD BY AUTOMATED COUNT: 17.5 %
EST. GFR  (AFRICAN AMERICAN): >60 ML/MIN/1.73 M^2
EST. GFR  (NON AFRICAN AMERICAN): >60 ML/MIN/1.73 M^2
ETHANOL SERPL-MCNC: <10 MG/DL
GLUCOSE SERPL-MCNC: 72 MG/DL
GLUCOSE UR QL STRIP: NEGATIVE
HCT VFR BLD AUTO: 40.9 %
HGB BLD-MCNC: 14.3 G/DL
HGB UR QL STRIP: NEGATIVE
HIV1+2 IGG SERPL QL IA.RAPID: NEGATIVE
INR PPP: 1.2
KETONES UR QL STRIP: NEGATIVE
LEUKOCYTE ESTERASE UR QL STRIP: NEGATIVE
LIPASE SERPL-CCNC: 10 U/L
LYMPHOCYTES # BLD AUTO: 2 K/UL
LYMPHOCYTES NFR BLD: 27.1 %
MCH RBC QN AUTO: 27.2 PG
MCHC RBC AUTO-ENTMCNC: 35 G/DL
MCV RBC AUTO: 78 FL
METHADONE UR QL SCN>300 NG/ML: NEGATIVE
MONOCYTES # BLD AUTO: 0.9 K/UL
MONOCYTES NFR BLD: 11.7 %
NEUTROPHILS # BLD AUTO: 4.3 K/UL
NEUTROPHILS NFR BLD: 57.8 %
NITRITE UR QL STRIP: NEGATIVE
OPIATES UR QL SCN: NEGATIVE
PCP UR QL SCN>25 NG/ML: NEGATIVE
PH UR STRIP: 7 [PH] (ref 5–8)
PHOSPHATE SERPL-MCNC: 3.2 MG/DL
PLATELET # BLD AUTO: 301 K/UL
PMV BLD AUTO: 10.8 FL
POTASSIUM SERPL-SCNC: 4.2 MMOL/L
PROT SERPL-MCNC: 6.6 G/DL
PROT UR QL STRIP: NEGATIVE
PROTHROMBIN TIME: 12.6 SEC
RBC # BLD AUTO: 5.26 M/UL
SODIUM SERPL-SCNC: 135 MMOL/L
SP GR UR STRIP: 1.02 (ref 1–1.03)
TOXICOLOGY INFORMATION: NORMAL
TROPONIN I SERPL DL<=0.01 NG/ML-MCNC: <0.006 NG/ML
URN SPEC COLLECT METH UR: ABNORMAL
UROBILINOGEN UR STRIP-ACNC: NEGATIVE EU/DL
WBC # BLD AUTO: 7.46 K/UL

## 2018-07-03 PROCEDURE — 93005 ELECTROCARDIOGRAM TRACING: CPT

## 2018-07-03 PROCEDURE — 85025 COMPLETE CBC W/AUTO DIFF WBC: CPT

## 2018-07-03 PROCEDURE — G0378 HOSPITAL OBSERVATION PER HR: HCPCS

## 2018-07-03 PROCEDURE — 83690 ASSAY OF LIPASE: CPT

## 2018-07-03 PROCEDURE — 80074 ACUTE HEPATITIS PANEL: CPT

## 2018-07-03 PROCEDURE — 86703 HIV-1/HIV-2 1 RESULT ANTBDY: CPT

## 2018-07-03 PROCEDURE — 99285 EMERGENCY DEPT VISIT HI MDM: CPT | Mod: 25

## 2018-07-03 PROCEDURE — 96361 HYDRATE IV INFUSION ADD-ON: CPT

## 2018-07-03 PROCEDURE — 80307 DRUG TEST PRSMV CHEM ANLYZR: CPT

## 2018-07-03 PROCEDURE — 93010 ELECTROCARDIOGRAM REPORT: CPT | Mod: ,,, | Performed by: INTERNAL MEDICINE

## 2018-07-03 PROCEDURE — 36415 COLL VENOUS BLD VENIPUNCTURE: CPT

## 2018-07-03 PROCEDURE — 86235 NUCLEAR ANTIGEN ANTIBODY: CPT

## 2018-07-03 PROCEDURE — 85610 PROTHROMBIN TIME: CPT

## 2018-07-03 PROCEDURE — 80329 ANALGESICS NON-OPIOID 1 OR 2: CPT

## 2018-07-03 PROCEDURE — 84100 ASSAY OF PHOSPHORUS: CPT

## 2018-07-03 PROCEDURE — 25000003 PHARM REV CODE 250: Performed by: EMERGENCY MEDICINE

## 2018-07-03 PROCEDURE — 80053 COMPREHEN METABOLIC PANEL: CPT

## 2018-07-03 PROCEDURE — 84484 ASSAY OF TROPONIN QUANT: CPT

## 2018-07-03 PROCEDURE — 85730 THROMBOPLASTIN TIME PARTIAL: CPT

## 2018-07-03 PROCEDURE — 81003 URINALYSIS AUTO W/O SCOPE: CPT | Mod: 59

## 2018-07-03 PROCEDURE — 63600175 PHARM REV CODE 636 W HCPCS: Performed by: EMERGENCY MEDICINE

## 2018-07-03 PROCEDURE — 96374 THER/PROPH/DIAG INJ IV PUSH: CPT

## 2018-07-03 PROCEDURE — 86256 FLUORESCENT ANTIBODY TITER: CPT | Mod: 91

## 2018-07-03 PROCEDURE — 99204 OFFICE O/P NEW MOD 45 MIN: CPT | Mod: ,,, | Performed by: INTERNAL MEDICINE

## 2018-07-03 PROCEDURE — 80320 DRUG SCREEN QUANTALCOHOLS: CPT

## 2018-07-03 PROCEDURE — 86038 ANTINUCLEAR ANTIBODIES: CPT

## 2018-07-03 RX ORDER — IBUPROFEN 400 MG/1
400 TABLET ORAL
Status: COMPLETED | OUTPATIENT
Start: 2018-07-03 | End: 2018-07-03

## 2018-07-03 RX ORDER — ONDANSETRON 2 MG/ML
4 INJECTION INTRAMUSCULAR; INTRAVENOUS
Status: COMPLETED | OUTPATIENT
Start: 2018-07-03 | End: 2018-07-03

## 2018-07-03 RX ORDER — SODIUM CHLORIDE 9 MG/ML
1000 INJECTION, SOLUTION INTRAVENOUS
Status: COMPLETED | OUTPATIENT
Start: 2018-07-03 | End: 2018-07-03

## 2018-07-03 RX ADMIN — SODIUM CHLORIDE 1000 ML: 0.9 INJECTION, SOLUTION INTRAVENOUS at 01:07

## 2018-07-03 RX ADMIN — SODIUM CHLORIDE 1000 ML: 0.9 INJECTION, SOLUTION INTRAVENOUS at 03:07

## 2018-07-03 RX ADMIN — IBUPROFEN 400 MG: 400 TABLET, FILM COATED ORAL at 02:07

## 2018-07-03 RX ADMIN — ONDANSETRON 4 MG: 2 INJECTION, SOLUTION INTRAMUSCULAR; INTRAVENOUS at 01:07

## 2018-07-03 NOTE — HPI
"Jessica Leos is a 38 year old female with history of seizures who presents to emergency room for further evaluation of persistent nausea and vomiting. Symptoms were initially felt to be related to "stomach bug", but did not improve over time. Symptoms have been present over the last 5-6 days. She admits to associated abdominal discomfort, subjective fever, and jaundice.   In ED, lab work revealed AST 1946, ALT 1288, Total Bilirubin 12.1. INR 1.2. Denies use of frequent tylenol and ETOH. Liver ultrasound unremarkable, but notes 14 mm hyperechoic lesion of the right lobe of the liver. She denies IV drug use. Her last tattoo 6 months ago; she is sexually active. Urine drug screen positive for amphetamines and THC. She is tolerating clear liquids, requesting solids.     "

## 2018-07-03 NOTE — ED PROVIDER NOTES
SCRIBE #1 NOTE: I, Jana Joseph, am scribing for, and in the presence of, Ana Devine MD. I have scribed the entire note.      History      Chief Complaint   Patient presents with    Emesis     vomiting x6 days. +generalized body aches and pt has complaints of strong urine smell. +fever at home.        Review of patient's allergies indicates:  No Known Allergies     HPI   HPI    7/3/2018, 11:59 AM   History obtained from the patient      History of Present Illness: April Dafne is a 38 y.o. female patient who presents to the Emergency Department for emesis which onset suddenly 5 days ago. Symptoms are episodic and moderate in severity. Patient also c/o having abdominal pain which began this AM. This morning she also noticed that her eyes and legs have a yellowish tint. She denies a hx of hepatitis. She is a former drinker, last drank several months ago and was not a heavy drinker. Pt does take Tylenol PRN for pain but denies taking Tylenol in excessive amounts recently. Pt reports no factors that improve or worsen sxs. Patient denies leg edema, CP, SOB, diarrhea, dizziness, lightheadedness, and all other sxs at this time. No further complaints or concerns at this time.       Arrival mode: Personal vehicle    PCP: Von Rios MD       Past Medical History:  Past Medical History:   Diagnosis Date    Seizures        Past Surgical History:  Past Surgical History:   Procedure Laterality Date     SECTION      x 6     SECTION      HERNIA REPAIR      TONSILLECTOMY           Family History:  History reviewed. No pertinent family history.    Social History:  Social History     Social History Main Topics    Smoking status: Current Every Day Smoker     Packs/day: 0.50     Types: Cigarettes    Smokeless tobacco: Never Used    Alcohol use No    Drug use: No    Sexual activity: Yes     Partners: Male       ROS   Review of Systems   Constitutional: Negative for fever.   HENT: Negative for  sore throat.    Respiratory: Negative for shortness of breath.    Cardiovascular: Negative for chest pain, palpitations and leg swelling.   Gastrointestinal: Positive for abdominal pain, nausea and vomiting. Negative for blood in stool, constipation and diarrhea.   Genitourinary: Negative for dysuria.   Musculoskeletal: Negative for back pain.   Skin: Positive for color change. Negative for rash.   Neurological: Negative for dizziness, weakness, numbness and headaches.   Hematological: Does not bruise/bleed easily.   All other systems reviewed and are negative.      Physical Exam      Initial Vitals [07/03/18 1142]   BP Pulse Resp Temp SpO2   106/69 83 20 98 °F (36.7 °C) 97 %      MAP       --          Physical Exam  Nursing Notes and Vital Signs Reviewed.  Constitutional: Patient is in no acute distress. Awake and alert. Well-developed and well-nourished.  Head: Atraumatic. Normocephalic.  Eyes: PERRL. EOM intact. Scleral icterus. Conjunctiva is not pale.  ENT: Mucous membranes are moist. Oropharynx is clear and symmetric.    Neck: Supple. Full ROM. No lymphadenopathy.  Cardiovascular: Regular rate. Regular rhythm. No murmurs, rubs, or gallops. Distal pulses are 2+ and symmetric.  Pulmonary/Chest: No respiratory distress. Clear to auscultation bilaterally. No wheezing, rales, or rhonchi.  Abdominal: Soft and non-distended.  There is mild epigastric tenderness.  No rebound, guarding, or rigidity.  Good bowel sounds.    Musculoskeletal: Moves all extremities. No obvious deformities. No edema. No calf tenderness.  Skin: Warm and dry. Jaundiced.  Neurological:  Alert, awake, and appropriate.  Normal speech.  No acute focal neurological deficits are appreciated.  Psychiatric: Normal affect. Good eye contact. Appropriate in content.    ED Course    Procedures  ED Vital Signs:  Vitals:    07/03/18 1142 07/03/18 1154 07/03/18 1159 07/03/18 1231   BP: 106/69 127/87  127/71   Pulse: 83  72 60   Resp: 20  16 20   Temp: 98 °F  "(36.7 °C)      TempSrc: Oral      SpO2: 97%  100% 98%   Weight: 93.3 kg (205 lb 12.8 oz)      Height: 4' 11" (1.499 m)       07/03/18 1312 07/03/18 1317 07/03/18 1517 07/03/18 1521   BP:   (!) 110/56    Pulse: 62 62  65   Resp:  (!) 21  (!) 23   Temp:       TempSrc:       SpO2:  98% 95% 97%   Weight:       Height:           Abnormal Lab Results:  Labs Reviewed   CBC W/ AUTO DIFFERENTIAL - Abnormal; Notable for the following:        Result Value    MCV 78 (*)     RDW 17.5 (*)     All other components within normal limits   COMPREHENSIVE METABOLIC PANEL - Abnormal; Notable for the following:     Sodium 135 (*)     BUN, Bld 5 (*)     Albumin 2.9 (*)     Total Bilirubin 12.1 (*)     Alkaline Phosphatase 264 (*)     AST 1,946 (*)     ALT 1,288 (*)     All other components within normal limits   URINALYSIS - Abnormal; Notable for the following:     Bilirubin (UA) 3+ (*)     All other components within normal limits   PROTIME-INR - Abnormal; Notable for the following:     Prothrombin Time 12.6 (*)     All other components within normal limits   APTT - Abnormal; Notable for the following:     aPTT 32.1 (*)     All other components within normal limits   ACETAMINOPHEN LEVEL - Abnormal; Notable for the following:     Acetaminophen (Tylenol), Serum <3.0 (*)     All other components within normal limits   LIPASE   TROPONIN I   DRUG SCREEN PANEL, URINE EMERGENCY   ALCOHOL,MEDICAL (ETHANOL)   ALCOHOL,MEDICAL (ETHANOL)   RAPID HIV   PHOSPHORUS   PHOSPHORUS   HEPATITIS PANEL, ACUTE   ANTIMITOCHONDRIAL ANTIBODY   ANTI-SMOOTH MUSCLE ANTIBODY   SANDEEP PROFILE I (SCREEN)        All Lab Results:  Results for orders placed or performed during the hospital encounter of 07/03/18   CBC W/ AUTO DIFFERENTIAL   Result Value Ref Range    WBC 7.46 3.90 - 12.70 K/uL    RBC 5.26 4.00 - 5.40 M/uL    Hemoglobin 14.3 12.0 - 16.0 g/dL    Hematocrit 40.9 37.0 - 48.5 %    MCV 78 (L) 82 - 98 fL    MCH 27.2 27.0 - 31.0 pg    MCHC 35.0 32.0 - 36.0 g/dL    RDW " 17.5 (H) 11.5 - 14.5 %    Platelets 301 150 - 350 K/uL    MPV 10.8 9.2 - 12.9 fL    Gran # (ANC) 4.3 1.8 - 7.7 K/uL    Lymph # 2.0 1.0 - 4.8 K/uL    Mono # 0.9 0.3 - 1.0 K/uL    Eos # 0.2 0.0 - 0.5 K/uL    Baso # 0.04 0.00 - 0.20 K/uL    Gran% 57.8 38.0 - 73.0 %    Lymph% 27.1 18.0 - 48.0 %    Mono% 11.7 4.0 - 15.0 %    Eosinophil% 2.9 0.0 - 8.0 %    Basophil% 0.5 0.0 - 1.9 %    Differential Method Automated    Comp. Metabolic Panel   Result Value Ref Range    Sodium 135 (L) 136 - 145 mmol/L    Potassium 4.2 3.5 - 5.1 mmol/L    Chloride 100 95 - 110 mmol/L    CO2 25 23 - 29 mmol/L    Glucose 72 70 - 110 mg/dL    BUN, Bld 5 (L) 6 - 20 mg/dL    Creatinine 0.6 0.5 - 1.4 mg/dL    Calcium 8.8 8.7 - 10.5 mg/dL    Total Protein 6.6 6.0 - 8.4 g/dL    Albumin 2.9 (L) 3.5 - 5.2 g/dL    Total Bilirubin 12.1 (H) 0.1 - 1.0 mg/dL    Alkaline Phosphatase 264 (H) 55 - 135 U/L    AST 1,946 (H) 10 - 40 U/L    ALT 1,288 (H) 10 - 44 U/L    Anion Gap 10 8 - 16 mmol/L    eGFR if African American >60 >60 mL/min/1.73 m^2    eGFR if non African American >60 >60 mL/min/1.73 m^2   Lipase   Result Value Ref Range    Lipase 10 4 - 60 U/L   Urinalysis - Clean Catch   Result Value Ref Range    Specimen UA Urine, Clean Catch     Color, UA Yellow Yellow, Straw, Emilee    Appearance, UA Clear Clear    pH, UA 7.0 5.0 - 8.0    Specific Gravity, UA 1.020 1.005 - 1.030    Protein, UA Negative Negative    Glucose, UA Negative Negative    Ketones, UA Negative Negative    Bilirubin (UA) 3+ (A) Negative    Occult Blood UA Negative Negative    Nitrite, UA Negative Negative    Urobilinogen, UA Negative <2.0 EU/dL    Leukocytes, UA Negative Negative   Protime-INR   Result Value Ref Range    Prothrombin Time 12.6 (H) 9.0 - 12.5 sec    INR 1.2 0.8 - 1.2   APTT   Result Value Ref Range    aPTT 32.1 (H) 21.0 - 32.0 sec   Troponin I   Result Value Ref Range    Troponin I <0.006 0.000 - 0.026 ng/mL   Drug screen panel, emergency   Result Value Ref Range     Benzodiazepines Negative     Methadone metabolites Negative     Cocaine (Metab.) Negative     Opiate Scrn, Ur Negative     Barbiturate Screen, Ur Negative     Amphetamine Screen, Ur Presumptive Positive     THC Presumptive Positive     Phencyclidine Negative     Creatinine, Random Ur 121.1 15.0 - 325.0 mg/dL    Toxicology Information SEE COMMENT    Acetaminophen level   Result Value Ref Range    Acetaminophen (Tylenol), Serum <3.0 (L) 10.0 - 20.0 ug/mL   Ethanol   Result Value Ref Range    Alcohol, Medical, Serum <10 <10 mg/dL   Rapid HIV   Result Value Ref Range    HIV Rapid Testing Negative Negative   Phosphorus   Result Value Ref Range    Phosphorus 3.2 2.7 - 4.5 mg/dL     Imaging Results:  Imaging Results          US Abdomen Limited (Final result)  Result time 07/03/18 14:32:59    Final result by Stalin Brody MD (07/03/18 14:32:59)                 Impression:      Completely contracted gallbladder.  No evidence of biliary obstruction.  14 mm hyperechoic lesion of the right lobe of the liver.  Pancreas not evaluated.      Electronically signed by: Stalin Brody MD  Date:    07/03/2018  Time:    14:32             Narrative:    EXAMINATION:  US ABDOMEN LIMITED    CLINICAL HISTORY:  Jaundice, elevated LFTs    COMPARISON:  01/23/2017 ultrasound, 08/13/2015 CT    FINDINGS:  The gallbladder is completely contracted and not well evaluated.  The common bile duct measures 4.6 mm.  No intrahepatic bile duct dilatation.  There is a 14 mm hyperechoic lesion of the right lobe of the liver which may be consistent with hemangioma, however note is made that it is not demonstrated on prior ultrasound or CT.  Normal hepato pedal flow in the main portal vein.  No free fluid.  The pancreas is obscured by overlying bowel gas.  No abnormality of the right kidney is seen.                               X-Ray Abdomen Flat And Erect (Final result)  Result time 07/03/18 13:42:53    Final result by Stalin Brody MD (07/03/18  13:42:53)                 Impression:      Negative      Electronically signed by: Stalin Brody MD  Date:    07/03/2018  Time:    13:42             Narrative:    EXAMINATION:  XR ABDOMEN FLAT AND ERECT    CLINICAL HISTORY:  Abdominal Pain;    COMPARISON:  None    FINDINGS:  The bowel gas pattern is nonspecific.  No free air or abnormal abdominal or pelvic calcifications.                               The EKG was ordered, reviewed, and independently interpreted by the ED provider.  Interpretation time: 13:12  Rate: 62 BPM  Rhythm: normal sinus rhythm  Interpretation: Cannot rule out anterior infarct. No STEMI.    The Emergency Provider reviewed the vital signs and test results, which are outlined above.    ED Discussion   Patient is tolerating PO.    2:44 PM: Dr. Devine discussed the pt's case with Dr. Aranda (GI) who is not sure if patient needs to be admitted at this time. They will be in ED to see patient.    3:27 PM: Kwame Sweeney (GI) saw patient in ED and recommends placing patient in Observation to monitor INR.    3:43 PM: Discussed case with KARISSA Nova ([facility]).   Dr. Tang agrees with current care and management of pt and accepts admission.   Admitting Service: Hospital medicine   Admitting Physician: Dr. Tang  Admit to: Obs    Re-evaluated pt. I have discussed test results, shared treatment plan, and the need for admission with patient and family at bedside. Pt and family express understanding at this time and agree with all information. All questions answered. Pt and family have no further questions or concerns at this time. Pt is ready for admit.    ED Medication(s):  Medications   sodium chloride 0.9% bolus 1,000 mL (0 mLs Intravenous Stopped 7/3/18 1522)   ondansetron injection 4 mg (4 mg Intravenous Given 7/3/18 1323)   ibuprofen tablet 400 mg (400 mg Oral Given 7/3/18 1436)   0.9%  NaCl infusion (0 mLs Intravenous Stopped 7/3/18 1711)       New Prescriptions    No medications on file             Medical Decision Making    Medical Decision Making:   Clinical Tests:   Lab Tests: Reviewed and Ordered  Radiological Study: Ordered and Reviewed           Scribe Attestation:   Scribe #1: I performed the above scribed service and the documentation accurately describes the services I performed. I attest to the accuracy of the note.    Attending:   Physician Attestation Statement for Scribe #1: I, Ana Devine MD, personally performed the services described in this documentation, as scribed by Jana Joseph, in my presence, and it is both accurate and complete.          Clinical Impression       ICD-10-CM ICD-9-CM   1. Transaminitis R74.0 790.4   2. Hyperbilirubinemia E80.6 782.4   3. Substance abuse F19.10 305.90       Disposition:   Disposition: Placed in Observation  Condition: Fair         Ana Devien MD  07/03/18 3803

## 2018-07-03 NOTE — SUBJECTIVE & OBJECTIVE
Past Medical History:   Diagnosis Date    Seizures        Past Surgical History:   Procedure Laterality Date     SECTION      x 6     SECTION      HERNIA REPAIR      TONSILLECTOMY       Review of patient's allergies indicates:  No Known Allergies    No current facility-administered medications on file prior to encounter.      Current Outpatient Prescriptions on File Prior to Encounter   Medication Sig    acetaminophen (TYLENOL) 500 MG tablet Take 2 tablets (1,000 mg total) by mouth 3 (three) times daily as needed for Pain.     Family History     None        Social History Main Topics    Smoking status: Current Every Day Smoker     Packs/day: 0.50     Types: Cigarettes    Smokeless tobacco: Never Used    Alcohol use No    Drug use: No    Sexual activity: Yes     Partners: Male     Review of Systems   Constitutional: Negative for activity change, diaphoresis, fatigue and unexpected weight change.   HENT: Negative for congestion, ear pain and sore throat.    Eyes: Negative.    Respiratory: Negative for shortness of breath and wheezing.    Cardiovascular: Negative for chest pain and palpitations.   Gastrointestinal: Positive for abdominal pain, diarrhea, nausea and vomiting. Negative for constipation.   Endocrine: Negative.    Genitourinary: Negative for flank pain, hematuria and urgency.   Musculoskeletal: Negative for joint swelling and neck pain.   Skin: Positive for color change. Negative for pallor.   Neurological: Negative for seizures, syncope and light-headedness.   Hematological: Negative.    Psychiatric/Behavioral: Negative.       Objective:     Vital Signs (Most Recent):  Temp: 98 °F (36.7 °C) (18 1142)  Pulse: 65 (18 1521)  Resp: (!) 23 (18 1521)  BP: (!) 110/56 (18 1517)  SpO2: 97 % (18 1521) Vital Signs (24h Range):  Temp:  [98 °F (36.7 °C)] 98 °F (36.7 °C)  Pulse:  [60-83] 65  Resp:  [16-23] 23  SpO2:  [95 %-100 %] 97 %  BP: (106-127)/(56-87) 110/56      Weight: 93.3 kg (205 lb 12.8 oz)  Body mass index is 41.57 kg/m².    Physical Exam   Constitutional: She is oriented to person, place, and time. She appears well-developed and well-nourished.   HENT:   Head: Normocephalic and atraumatic.   Eyes: EOM are normal. Scleral icterus is present.   Neck: Normal range of motion. Neck supple. Carotid bruit is not present.   Cardiovascular: Normal rate and regular rhythm.    No murmur heard.  Pulmonary/Chest: Effort normal and breath sounds normal. No respiratory distress. She has no wheezes.   Abdominal: Soft. Normal appearance and bowel sounds are normal. She exhibits no distension. There is no tenderness.   Musculoskeletal: She exhibits no edema.   Neurological: She is alert and oriented to person, place, and time. No cranial nerve deficit.   Skin: Skin is warm and dry. No rash noted.   Tattoos noted to arm and left upper chest wall.     Psychiatric: Her behavior is normal.         CRANIAL NERVES     CN III, IV, VI   Extraocular motions are normal.     Significant Labs:   CBC:   Recent Labs  Lab 07/03/18  1316   WBC 7.46   HGB 14.3   HCT 40.9        CMP:   Recent Labs  Lab 07/03/18  1316   *   K 4.2      CO2 25   GLU 72   BUN 5*   CREATININE 0.6   CALCIUM 8.8   PROT 6.6   ALBUMIN 2.9*   BILITOT 12.1*   ALKPHOS 264*   AST 1,946*   ALT 1,288*   ANIONGAP 10   EGFRNONAA >60       Significant Imaging  Imaging Results          US Abdomen Limited (Final result)  Result time 07/03/18 14:32:59    Final result by Stalin Brody MD (07/03/18 14:32:59)                 Impression:      Completely contracted gallbladder.  No evidence of biliary obstruction.  14 mm hyperechoic lesion of the right lobe of the liver.  Pancreas not evaluated.      Electronically signed by: Stalin Brody MD  Date:    07/03/2018  Time:    14:32             Narrative:    EXAMINATION:  US ABDOMEN LIMITED    CLINICAL HISTORY:  Jaundice, elevated LFTs    COMPARISON:  01/23/2017  ultrasound, 08/13/2015 CT    FINDINGS:  The gallbladder is completely contracted and not well evaluated.  The common bile duct measures 4.6 mm.  No intrahepatic bile duct dilatation.  There is a 14 mm hyperechoic lesion of the right lobe of the liver which may be consistent with hemangioma, however note is made that it is not demonstrated on prior ultrasound or CT.  Normal hepato pedal flow in the main portal vein.  No free fluid.  The pancreas is obscured by overlying bowel gas.  No abnormality of the right kidney is seen.                               X-Ray Abdomen Flat And Erect (Final result)  Result time 07/03/18 13:42:53    Final result by Stalin Brody MD (07/03/18 13:42:53)                 Impression:      Negative      Electronically signed by: Stalin Brody MD  Date:    07/03/2018  Time:    13:42             Narrative:    EXAMINATION:  XR ABDOMEN FLAT AND ERECT    CLINICAL HISTORY:  Abdominal Pain;    COMPARISON:  None    FINDINGS:  The bowel gas pattern is nonspecific.  No free air or abnormal abdominal or pelvic calcifications.

## 2018-07-03 NOTE — ASSESSMENT & PLAN NOTE
-etiology unclear; pending hepatitis panel  -ultrasound essentially unremarkable.  -supportive care including IV fluids and antiemetics  -monitor liver enzymes  -GI input appreciated  -tolerating clear liquids; will advance to solid

## 2018-07-03 NOTE — HPI
The patient presented to the ER with a six day history of nausea, vomiting, abdominal pain, fever and diarrhea. She reported to the ER today because her eyes were yellow. Her AST and ALT are over 1000 with a total bili of 12.1. INR 1.2. She denies IV or intranasal drug use. She admits to a recent sexual encounter with a new person. She has professional tattoos; the last one is six months old. Her tox screen is positive for MJ and amphetamines. She denies hematemesis, hematochezia or melena. She denies a prior history of liver disease or hepatitis. She denies taking herbal supplements or OTC meds on a regular basis.

## 2018-07-03 NOTE — ASSESSMENT & PLAN NOTE
37 yo female with acute hepatitis. Suspect hepatitis B because of sexual history.   Hepatitis panel pending.   Check autoimmune labs.   Discussed transmission precautions.   Repeat INR and LFT in the morning.   She will need to follow up in GI clinic in one week for repeat labs and follow up.

## 2018-07-03 NOTE — CONSULTS
[facility] -   Gastroenterology  Consult Note    Patient Name: Jessica Leos  MRN: [id number]  Admission Date: 7/3/2018  Hospital Length of Stay: 0 days  Code Status: Prior   Attending Provider: Ana Devine MD   Consulting Provider: China Sweeney PA-C  Primary Care Physician: Von Rios MD  Principal Problem:<principal problem not specified>    Inpatient consult to Gastroenterology  Consult performed by: CHINA SWEENEY  Consult ordered by: ANA KING  Reason for consult: Acute hepatitis        Subjective:     HPI:  The patient presented to the ER with a six day history of nausea, vomiting, abdominal pain, fever and diarrhea. She reported to the ER today because her eyes were yellow. Her AST and ALT are over 1000 with a total bili of 12.1. INR 1.2. She denies IV or intranasal drug use. She admits to a recent sexual encounter with a new person. She has professional tattoos; the last one is six months old. Her tox screen is positive for MJ and amphetamines. She denies hematemesis, hematochezia or melena. She denies a prior history of liver disease or hepatitis. She denies taking herbal supplements or OTC meds on a regular basis.     Past Medical History:   Diagnosis Date    Seizures        Past Surgical History:   Procedure Laterality Date     SECTION      x 6     SECTION      HERNIA REPAIR      TONSILLECTOMY         Review of patient's allergies indicates:  No Known Allergies  Family History     None        Social History Main Topics    Smoking status: Current Every Day Smoker     Packs/day: 0.50     Types: Cigarettes    Smokeless tobacco: Never Used    Alcohol use No    Drug use: No    Sexual activity: Yes     Partners: Male     Review of Systems   Constitutional: Positive for fever (subjective).   HENT: Negative for hearing loss.    Eyes: Negative for visual disturbance.   Respiratory: Negative for cough and shortness of breath.    Cardiovascular:  Negative for chest pain.   Gastrointestinal:        As per HPI.   Genitourinary: Negative for dysuria, frequency and hematuria.   Musculoskeletal: Negative for arthralgias and back pain.   Skin: Negative for rash.   Neurological: Negative for seizures, syncope, numbness and headaches.   Hematological: Does not bruise/bleed easily.   Psychiatric/Behavioral: The patient is not nervous/anxious.      Objective:     Vital Signs (Most Recent):  Temp: 98 °F (36.7 °C) (07/03/18 1142)  Pulse: 65 (07/03/18 1521)  Resp: (!) 23 (07/03/18 1521)  BP: (!) 110/56 (07/03/18 1517)  SpO2: 97 % (07/03/18 1521) Vital Signs (24h Range):  Temp:  [98 °F (36.7 °C)] 98 °F (36.7 °C)  Pulse:  [60-83] 65  Resp:  [16-23] 23  SpO2:  [95 %-100 %] 97 %  BP: (106-127)/(56-87) 110/56     Weight: 93.3 kg (205 lb 12.8 oz) (07/03/18 1142)  Body mass index is 41.57 kg/m².    No intake or output data in the 24 hours ending 07/03/18 1537    Lines/Drains/Airways     Peripheral Intravenous Line                 Peripheral IV - Single Lumen 07/03/18 1157 Left Hand less than 1 day                Physical Exam   Constitutional: She is oriented to person, place, and time. She appears well-developed and well-nourished.   HENT:   Head: Normocephalic and atraumatic.   Eyes: EOM are normal. Scleral icterus is present.   Neck: Normal range of motion. Neck supple. Carotid bruit is not present.   Cardiovascular: Normal rate and regular rhythm.    No murmur heard.  Pulmonary/Chest: Effort normal and breath sounds normal. No respiratory distress. She has no wheezes.   Abdominal: Soft. Normal appearance and bowel sounds are normal. She exhibits no distension and no mass. There is no tenderness.   Musculoskeletal: She exhibits no edema.   Neurological: She is alert and oriented to person, place, and time. No cranial nerve deficit.   Skin: Skin is warm and dry. No rash noted.   Psychiatric: Her behavior is normal.       Significant Labs:  CBC:   Recent Labs  Lab  07/03/18  1316   WBC 7.46   HGB 14.3   HCT 40.9        CMP:   Recent Labs  Lab 07/03/18  1316   GLU 72   CALCIUM 8.8   ALBUMIN 2.9*   PROT 6.6   *   K 4.2   CO2 25      BUN 5*   CREATININE 0.6   ALKPHOS 264*   ALT 1,288*   AST 1,946*   BILITOT 12.1*     Coagulation:   Recent Labs  Lab 07/03/18  1316   INR 1.2   APTT 32.1*       Significant Imaging:  Imaging results within the past 24 hours have been reviewed.    Assessment/Plan:     Acute hepatitis    37 yo female with acute hepatitis. Suspect hepatitis B because of sexual history.   Hepatitis panel pending.   Check autoimmune labs.   Discussed transmission precautions.   Repeat INR and LFT in the morning.   She will need to follow up in GI clinic in one week for repeat labs and follow up.             Thank you for your consult. I will follow-up with patient. Please contact us if you have any additional questions.    Kwame Sweeney PA-C  Gastroenterology  [facility]

## 2018-07-03 NOTE — H&P
"[facility] Hospital Medicine  History & Physical    Patient Name: Jessica Leos  MRN: [id number]  Admission Date: 7/3/2018  Attending Physician: Ana Devine MD   Primary Care Provider: Vno Rios MD      Patient information was obtained from patient and ER records.     Subjective:     Principal Problem:<principal problem not specified>    Chief Complaint:   Chief Complaint   Patient presents with    Emesis     vomiting x6 days. +generalized body aches and pt has complaints of strong urine smell. +fever at home.         HPI: Jessica Leos is a 38 year old female with history of seizures who presents to emergency room for further evaluation of persistent nausea and vomiting. Symptoms were initially felt to be related to "stomach bug", but did not improve over time. Symptoms have been present over the last 5-6 days. She admits to associated abdominal discomfort, subjective fever, and jaundice.   In ED, lab work revealed AST 1946, ALT 1288, Total Bilirubin 12.1. INR 1.2. Denies use of frequent tylenol and ETOH. Liver ultrasound unremarkable, but notes 14 mm hyperechoic lesion of the right lobe of the liver. She denies IV drug use. Her last tattoo 6 months ago; she is sexually active. Urine drug screen positive for amphetamines and THC. She is tolerating clear liquids, requesting solids.       Past Medical History:   Diagnosis Date    Seizures        Past Surgical History:   Procedure Laterality Date     SECTION      x 6     SECTION      HERNIA REPAIR      TONSILLECTOMY       Review of patient's allergies indicates:  No Known Allergies    No current facility-administered medications on file prior to encounter.      Current Outpatient Prescriptions on File Prior to Encounter   Medication Sig    acetaminophen (TYLENOL) 500 MG tablet Take 2 tablets (1,000 mg total) by mouth 3 (three) times daily as needed for Pain.     Family History     None        Social History Main " Topics    Smoking status: Current Every Day Smoker     Packs/day: 0.50     Types: Cigarettes    Smokeless tobacco: Never Used    Alcohol use No    Drug use: No    Sexual activity: Yes     Partners: Male     Review of Systems   Constitutional: Negative for activity change, diaphoresis, fatigue and unexpected weight change.   HENT: Negative for congestion, ear pain and sore throat.    Eyes: Negative.    Respiratory: Negative for shortness of breath and wheezing.    Cardiovascular: Negative for chest pain and palpitations.   Gastrointestinal: Positive for abdominal pain, diarrhea, nausea and vomiting. Negative for constipation.   Endocrine: Negative.    Genitourinary: Negative for flank pain, hematuria and urgency.   Musculoskeletal: Negative for joint swelling and neck pain.   Skin: Positive for color change. Negative for pallor.   Neurological: Negative for seizures, syncope and light-headedness.   Hematological: Negative.    Psychiatric/Behavioral: Negative.       Objective:     Vital Signs (Most Recent):  Temp: 98 °F (36.7 °C) (07/03/18 1142)  Pulse: 65 (07/03/18 1521)  Resp: (!) 23 (07/03/18 1521)  BP: (!) 110/56 (07/03/18 1517)  SpO2: 97 % (07/03/18 1521) Vital Signs (24h Range):  Temp:  [98 °F (36.7 °C)] 98 °F (36.7 °C)  Pulse:  [60-83] 65  Resp:  [16-23] 23  SpO2:  [95 %-100 %] 97 %  BP: (106-127)/(56-87) 110/56     Weight: 93.3 kg (205 lb 12.8 oz)  Body mass index is 41.57 kg/m².    Physical Exam   Constitutional: She is oriented to person, place, and time. She appears well-developed and well-nourished.   HENT:   Head: Normocephalic and atraumatic.   Eyes: EOM are normal. Scleral icterus is present.   Neck: Normal range of motion. Neck supple. Carotid bruit is not present.   Cardiovascular: Normal rate and regular rhythm.    No murmur heard.  Pulmonary/Chest: Effort normal and breath sounds normal. No respiratory distress. She has no wheezes.   Abdominal: Soft. Normal appearance and bowel sounds are  normal. She exhibits no distension. There is no tenderness.   Musculoskeletal: She exhibits no edema.   Neurological: She is alert and oriented to person, place, and time. No cranial nerve deficit.   Skin: Skin is warm and dry. No rash noted.   Tattoos noted to arm and left upper chest wall.     Psychiatric: Her behavior is normal.         CRANIAL NERVES     CN III, IV, VI   Extraocular motions are normal.     Significant Labs:   CBC:   Recent Labs  Lab 07/03/18  1316   WBC 7.46   HGB 14.3   HCT 40.9        CMP:   Recent Labs  Lab 07/03/18  1316   *   K 4.2      CO2 25   GLU 72   BUN 5*   CREATININE 0.6   CALCIUM 8.8   PROT 6.6   ALBUMIN 2.9*   BILITOT 12.1*   ALKPHOS 264*   AST 1,946*   ALT 1,288*   ANIONGAP 10   EGFRNONAA >60       Significant Imaging  Imaging Results          US Abdomen Limited (Final result)  Result time 07/03/18 14:32:59    Final result by Stalin Brody MD (07/03/18 14:32:59)                 Impression:      Completely contracted gallbladder.  No evidence of biliary obstruction.  14 mm hyperechoic lesion of the right lobe of the liver.  Pancreas not evaluated.      Electronically signed by: Stalin Brody MD  Date:    07/03/2018  Time:    14:32             Narrative:    EXAMINATION:  US ABDOMEN LIMITED    CLINICAL HISTORY:  Jaundice, elevated LFTs    COMPARISON:  01/23/2017 ultrasound, 08/13/2015 CT    FINDINGS:  The gallbladder is completely contracted and not well evaluated.  The common bile duct measures 4.6 mm.  No intrahepatic bile duct dilatation.  There is a 14 mm hyperechoic lesion of the right lobe of the liver which may be consistent with hemangioma, however note is made that it is not demonstrated on prior ultrasound or CT.  Normal hepato pedal flow in the main portal vein.  No free fluid.  The pancreas is obscured by overlying bowel gas.  No abnormality of the right kidney is seen.                               X-Ray Abdomen Flat And Erect (Final result)   Result time 07/03/18 13:42:53    Final result by Stalin Brody MD (07/03/18 13:42:53)                 Impression:      Negative      Electronically signed by: Stalin Brody MD  Date:    07/03/2018  Time:    13:42             Narrative:    EXAMINATION:  XR ABDOMEN FLAT AND ERECT    CLINICAL HISTORY:  Abdominal Pain;    COMPARISON:  None    FINDINGS:  The bowel gas pattern is nonspecific.  No free air or abnormal abdominal or pelvic calcifications.                                    Assessment/Plan:     Acute hepatitis    -etiology unclear; pending hepatitis panel  -ultrasound essentially unremarkable.  -supportive care including IV fluids and antiemetics  -monitor liver enzymes  -GI input appreciated  -tolerating clear liquids; will advance to solid            VTE Risk Mitigation     None          Deshaun Mayo NP  Department of Hospital Medicine   [facility]

## 2018-07-03 NOTE — SUBJECTIVE & OBJECTIVE
Past Medical History:   Diagnosis Date    Seizures        Past Surgical History:   Procedure Laterality Date     SECTION      x 6     SECTION      HERNIA REPAIR      TONSILLECTOMY         Review of patient's allergies indicates:  No Known Allergies  Family History     None        Social History Main Topics    Smoking status: Current Every Day Smoker     Packs/day: 0.50     Types: Cigarettes    Smokeless tobacco: Never Used    Alcohol use No    Drug use: No    Sexual activity: Yes     Partners: Male     Review of Systems   Constitutional: Positive for fever (subjective).   HENT: Negative for hearing loss.    Eyes: Negative for visual disturbance.   Respiratory: Negative for cough and shortness of breath.    Cardiovascular: Negative for chest pain.   Gastrointestinal:        As per HPI.   Genitourinary: Negative for dysuria, frequency and hematuria.   Musculoskeletal: Negative for arthralgias and back pain.   Skin: Negative for rash.   Neurological: Negative for seizures, syncope, numbness and headaches.   Hematological: Does not bruise/bleed easily.   Psychiatric/Behavioral: The patient is not nervous/anxious.      Objective:     Vital Signs (Most Recent):  Temp: 98 °F (36.7 °C) (18 1142)  Pulse: 65 (18 1521)  Resp: (!) 23 (18 1521)  BP: (!) 110/56 (18 1517)  SpO2: 97 % (18 1521) Vital Signs (24h Range):  Temp:  [98 °F (36.7 °C)] 98 °F (36.7 °C)  Pulse:  [60-83] 65  Resp:  [16-23] 23  SpO2:  [95 %-100 %] 97 %  BP: (106-127)/(56-87) 110/56     Weight: 93.3 kg (205 lb 12.8 oz) (18 1142)  Body mass index is 41.57 kg/m².    No intake or output data in the 24 hours ending 18 1537    Lines/Drains/Airways     Peripheral Intravenous Line                 Peripheral IV - Single Lumen 18 1157 Left Hand less than 1 day                Physical Exam   Constitutional: She is oriented to person, place, and time. She appears well-developed and well-nourished.    HENT:   Head: Normocephalic and atraumatic.   Eyes: EOM are normal. Scleral icterus is present.   Neck: Normal range of motion. Neck supple. Carotid bruit is not present.   Cardiovascular: Normal rate and regular rhythm.    No murmur heard.  Pulmonary/Chest: Effort normal and breath sounds normal. No respiratory distress. She has no wheezes.   Abdominal: Soft. Normal appearance and bowel sounds are normal. She exhibits no distension and no mass. There is no tenderness.   Musculoskeletal: She exhibits no edema.   Neurological: She is alert and oriented to person, place, and time. No cranial nerve deficit.   Skin: Skin is warm and dry. No rash noted.   Psychiatric: Her behavior is normal.       Significant Labs:  CBC:   Recent Labs  Lab 07/03/18  1316   WBC 7.46   HGB 14.3   HCT 40.9        CMP:   Recent Labs  Lab 07/03/18  1316   GLU 72   CALCIUM 8.8   ALBUMIN 2.9*   PROT 6.6   *   K 4.2   CO2 25      BUN 5*   CREATININE 0.6   ALKPHOS 264*   ALT 1,288*   AST 1,946*   BILITOT 12.1*     Coagulation:   Recent Labs  Lab 07/03/18  1316   INR 1.2   APTT 32.1*       Significant Imaging:  Imaging results within the past 24 hours have been reviewed.

## 2018-07-04 VITALS
TEMPERATURE: 98 F | HEART RATE: 59 BPM | OXYGEN SATURATION: 97 % | SYSTOLIC BLOOD PRESSURE: 104 MMHG | DIASTOLIC BLOOD PRESSURE: 57 MMHG | WEIGHT: 208.75 LBS | HEIGHT: 59 IN | BODY MASS INDEX: 42.08 KG/M2 | RESPIRATION RATE: 16 BRPM

## 2018-07-04 LAB
ALBUMIN SERPL BCP-MCNC: 2.4 G/DL
ALBUMIN SERPL BCP-MCNC: 2.5 G/DL
ALP SERPL-CCNC: 215 U/L
ALP SERPL-CCNC: 216 U/L
ALT SERPL W/O P-5'-P-CCNC: 1068 U/L
ALT SERPL W/O P-5'-P-CCNC: 1110 U/L
ANION GAP SERPL CALC-SCNC: 7 MMOL/L
ANION GAP SERPL CALC-SCNC: 8 MMOL/L
AST SERPL-CCNC: 1729 U/L
AST SERPL-CCNC: 1751 U/L
BILIRUB SERPL-MCNC: 11 MG/DL
BILIRUB SERPL-MCNC: 11.3 MG/DL
BUN SERPL-MCNC: 6 MG/DL
BUN SERPL-MCNC: 7 MG/DL
CALCIUM SERPL-MCNC: 8.4 MG/DL
CALCIUM SERPL-MCNC: 8.6 MG/DL
CHLORIDE SERPL-SCNC: 101 MMOL/L
CHLORIDE SERPL-SCNC: 105 MMOL/L
CO2 SERPL-SCNC: 25 MMOL/L
CO2 SERPL-SCNC: 25 MMOL/L
CREAT SERPL-MCNC: 0.7 MG/DL
CREAT SERPL-MCNC: 0.7 MG/DL
EST. GFR  (AFRICAN AMERICAN): >60 ML/MIN/1.73 M^2
EST. GFR  (AFRICAN AMERICAN): >60 ML/MIN/1.73 M^2
EST. GFR  (NON AFRICAN AMERICAN): >60 ML/MIN/1.73 M^2
EST. GFR  (NON AFRICAN AMERICAN): >60 ML/MIN/1.73 M^2
GLUCOSE SERPL-MCNC: 106 MG/DL
GLUCOSE SERPL-MCNC: 93 MG/DL
INR PPP: 1.3
INR PPP: 1.3
POTASSIUM SERPL-SCNC: 3.9 MMOL/L
POTASSIUM SERPL-SCNC: 4.2 MMOL/L
PROT SERPL-MCNC: 5.3 G/DL
PROT SERPL-MCNC: 5.7 G/DL
PROTHROMBIN TIME: 13.2 SEC
PROTHROMBIN TIME: 13.3 SEC
SODIUM SERPL-SCNC: 134 MMOL/L
SODIUM SERPL-SCNC: 137 MMOL/L

## 2018-07-04 PROCEDURE — 36415 COLL VENOUS BLD VENIPUNCTURE: CPT

## 2018-07-04 PROCEDURE — 80053 COMPREHEN METABOLIC PANEL: CPT

## 2018-07-04 PROCEDURE — 99213 OFFICE O/P EST LOW 20 MIN: CPT | Mod: ,,, | Performed by: INTERNAL MEDICINE

## 2018-07-04 PROCEDURE — 25000003 PHARM REV CODE 250: Performed by: NURSE PRACTITIONER

## 2018-07-04 PROCEDURE — 85610 PROTHROMBIN TIME: CPT

## 2018-07-04 PROCEDURE — G0378 HOSPITAL OBSERVATION PER HR: HCPCS

## 2018-07-04 PROCEDURE — 80053 COMPREHEN METABOLIC PANEL: CPT | Mod: 91

## 2018-07-04 PROCEDURE — 85610 PROTHROMBIN TIME: CPT | Mod: 91

## 2018-07-04 RX ORDER — OXYCODONE HYDROCHLORIDE 5 MG/1
5 TABLET ORAL EVERY 12 HOURS PRN
Qty: 6 TABLET | Refills: 0 | Status: SHIPPED | OUTPATIENT
Start: 2018-07-04 | End: 2019-10-08

## 2018-07-04 RX ORDER — ONDANSETRON 2 MG/ML
4 INJECTION INTRAMUSCULAR; INTRAVENOUS EVERY 8 HOURS PRN
Status: DISCONTINUED | OUTPATIENT
Start: 2018-07-04 | End: 2018-07-04 | Stop reason: HOSPADM

## 2018-07-04 RX ORDER — OXYCODONE HYDROCHLORIDE 5 MG/1
5 TABLET ORAL EVERY 8 HOURS PRN
Status: DISCONTINUED | OUTPATIENT
Start: 2018-07-04 | End: 2018-07-04 | Stop reason: HOSPADM

## 2018-07-04 RX ADMIN — OXYCODONE HYDROCHLORIDE 5 MG: 5 TABLET ORAL at 05:07

## 2018-07-04 RX ADMIN — OXYCODONE HYDROCHLORIDE 5 MG: 5 TABLET ORAL at 08:07

## 2018-07-04 NOTE — SUBJECTIVE & OBJECTIVE
Subjective:     Interval History: No acute issues. Overall feeling better but still weak. She has been tolerating PO meds. Labs checked twice today and stable. No confusion.    Review of Systems   Constitutional: Positive for activity change, appetite change and fatigue.   HENT: Negative.    Eyes: Negative.    Respiratory: Negative.    Cardiovascular: Negative.    Gastrointestinal: Negative.    Genitourinary: Negative.    Musculoskeletal: Negative.    Skin: Positive for color change.   Neurological: Negative.    Psychiatric/Behavioral: Negative.      Objective:     Vital Signs (Most Recent):  Temp: 98.4 °F (36.9 °C) (07/04/18 1609)  Pulse: (!) 59 (07/04/18 1609)  Resp: 16 (07/04/18 1609)  BP: (!) 104/57 (07/04/18 1609)  SpO2: 97 % (07/04/18 1609) Vital Signs (24h Range):  Temp:  [97.4 °F (36.3 °C)-98.4 °F (36.9 °C)] 98.4 °F (36.9 °C)  Pulse:  [59-80] 59  Resp:  [16-22] 16  SpO2:  [94 %-100 %] 97 %  BP: (102-124)/(57-78) 104/57     Weight: 94.7 kg (208 lb 12.4 oz) (07/04/18 0718)  Body mass index is 42.17 kg/m².      Intake/Output Summary (Last 24 hours) at 07/04/18 1733  Last data filed at 07/04/18 0500   Gross per 24 hour   Intake              472 ml   Output                0 ml   Net              472 ml       Lines/Drains/Airways     Peripheral Intravenous Line                 Peripheral IV - Single Lumen 07/03/18 1157 Left Hand 1 day                Physical Exam   Constitutional: She is oriented to person, place, and time. She appears well-developed and well-nourished. No distress.   HENT:   Head: Normocephalic and atraumatic.   Mouth/Throat: Oropharynx is clear and moist. No oropharyngeal exudate.   Eyes: Pupils are equal, round, and reactive to light. Right eye exhibits no discharge. Left eye exhibits no discharge. Scleral icterus is present.   Pulmonary/Chest: Effort normal and breath sounds normal. No respiratory distress. She has no wheezes.   Abdominal: Soft. She exhibits no distension. There is no  tenderness.   Musculoskeletal: She exhibits no edema.   Neurological: She is alert and oriented to person, place, and time.   Skin:   jaundiced   Psychiatric: She has a normal mood and affect. Her behavior is normal.   Vitals reviewed.      Significant Labs:  CBC:   Recent Labs  Lab 07/03/18  1316   WBC 7.46   HGB 14.3   HCT 40.9        CMP:   Recent Labs  Lab 07/04/18  1600   GLU 93   CALCIUM 8.6*   ALBUMIN 2.5*   PROT 5.7*   *   K 4.2   CO2 25      BUN 6   CREATININE 0.7   ALKPHOS 215*   ALT 1,110*   AST 1,751*   BILITOT 11.3*     Coagulation:   Recent Labs  Lab 07/03/18  1316  07/04/18  1600   INR 1.2  < > 1.3*   APTT 32.1*  --   --    < > = values in this interval not displayed.      Significant Imaging:  n/a

## 2018-07-04 NOTE — PLAN OF CARE
Problem: Patient Care Overview  Goal: Plan of Care Review  Outcome: Ongoing (interventions implemented as appropriate)  Pt AAO x 4   VSS.  Pt remained free of falls this shift.   Pt pain controlled with oxycodone PRN.  Plan of care reviewed. Patient verbalizes understanding.    Patient not on monitor.   Bed low, side rails up x 2, wheels locked, call light in reach.   Patient instructed to call for assistance.   Hourly rounding completed.   Will continue to monitor.    Monitoring AST/ALT and INR. Plan to recheck this afternoon.

## 2018-07-04 NOTE — NURSING
Patient discharged from observation after treatment for acute hepatitis. Reviewed discharge instructions and medications. Expressed the need for follow up appointments per physicians recommendations. Patient was given the opportunity for questions and all were answered to their satisfaction. Patient discharged in no acute distress.   IV removed from Lt hand without incident. Compression dressing applied and patient instructed to leave on no longer than 30 minutes.

## 2018-07-04 NOTE — PROGRESS NOTES
Patient seen and examined. Tolerating diet, but is still complaining of some abdominal discomfort. Liver enzymes remains elevated, slightly better. INR 1.3 today. Will repeat CMP at 1500 and discuss with GI. Further recs to follow.

## 2018-07-04 NOTE — HOSPITAL COURSE
Jessica Leos is a 38 year old female who was admitted to [facility] for supportive care of acute hepatitis. She presented to ED with abdominal pain, nausea, and vomiting. Marked transaminitis and elevated INR noted. Ultrasound revealed 14 mm hyperechoic lesion of the right lobe of liver, but otherwise unremarkable. Supportive care included IV hydration and antiemetics. She was seen by Gastroenterology who agreed with management. She denies IV drug abuse. UDS positive for amphetamines and THC. Liver enzymes are slowly improving. INR stable at 1.3. She will need to follow up with GI for hepatitis panel results and further management. Patient seen and examined on date of discharge and deemed suitable.

## 2018-07-04 NOTE — ASSESSMENT & PLAN NOTE
37 yo female with acute hepatitis. Suspect hepatitis B because of sexual history.  Liver tests have been stable on 2 checks today and no evidence of encephalopathy.  No concern for liver failure at this time.  Suspect she will have full liver recovery.  Hepatitis panel pending.   Check autoimmune labs.   Discussed transmission precautions.   Discuss signs of encephalopathy that she should look out for that would warrant immediate return to emergency department although I think this is low risk.  Set message to GI clinic to have patient get labs done with close monitoring as well as close follow-up appointment  She is okay for discharge

## 2018-07-04 NOTE — NURSING
Pt c/o pain in abd and Head at 7/10. She has no pain meds ordered at this time. Discussed with Shell NP and she will discuss what pt would be able to take for pain given her elevated LFT's. Await GI recommendations.

## 2018-07-04 NOTE — DISCHARGE SUMMARY
"[facility] Hospital Medicine  Discharge Summary      Patient Name: Jessica Leos  MRN: [id number]  Admission Date: 7/3/2018  Hospital Length of Stay: 0 days  Discharge Date and Time:  07/04/2018 5:59 PM  Attending Physician: Abebe Tang MD   Discharging Provider: Deshaun Mayo NP  Primary Care Provider: Von Rios MD      HPI:   Jessica Leos is a 38 year old female with history of seizures who presents to emergency room for further evaluation of persistent nausea and vomiting. Symptoms were initially felt to be related to "stomach bug", but did not improve over time. Symptoms have been present over the last 5-6 days. She admits to associated abdominal discomfort, subjective fever, and jaundice.   In ED, lab work revealed AST 1946, ALT 1288, Total Bilirubin 12.1. INR 1.2. Denies use of frequent tylenol and ETOH. Liver ultrasound unremarkable, but notes 14 mm hyperechoic lesion of the right lobe of the liver. She denies IV drug use. Her last tattoo 6 months ago; she is sexually active. Urine drug screen positive for amphetamines and THC. She is tolerating clear liquids, requesting solids.       * No surgery found *      Hospital Course:   Jessica Leos is a 38 year old female who was admitted to [facility] for supportive care of acute hepatitis. She presented to ED with abdominal pain, nausea, and vomiting. Marked transaminitis and elevated INR noted. Ultrasound revealed 14 mm hyperechoic lesion of the right lobe of liver, but otherwise unremarkable. Supportive care included IV hydration and antiemetics. She was seen by Gastroenterology who agreed with management. She denies IV drug abuse. UDS positive for amphetamines and THC. Liver enzymes are slowly improving. INR stable at 1.3. She will need to follow up with GI for hepatitis panel results and further management. Patient seen and examined on date of discharge and deemed suitable.      Consults:   Consults         Status " Ordering Provider     Inpatient consult to Gastroenterology  Once     Provider:  (Not yet assigned)    Completed DES KING          No new Assessment & Plan notes have been filed under this hospital service since the last note was generated.  Service: Hospital Medicine    Final Active Diagnoses:    Diagnosis Date Noted POA    PRINCIPAL PROBLEM:  Acute hepatitis [B17.9] 07/03/2018 Unknown      Problems Resolved During this Admission:    Diagnosis Date Noted Date Resolved POA       Discharged Condition: stable    Disposition: Home or Self Care    Follow Up:  Follow-up Information     Von Rios MD In 3 days.    Specialty:  Pediatrics  Contact information:  [address] [facility] [address] [phone]                 Patient Instructions:     Activity as tolerated         Significant Diagnostic Studies: Labs:   CMP   Recent Labs  Lab 07/03/18  1316 07/04/18  0351 07/04/18  1600   * 137 134*   K 4.2 3.9 4.2    105 101   CO2 25 25 25   GLU 72 106 93   BUN 5* 7 6   CREATININE 0.6 0.7 0.7   CALCIUM 8.8 8.4* 8.6*   PROT 6.6 5.3* 5.7*   ALBUMIN 2.9* 2.4* 2.5*   BILITOT 12.1* 11.0* 11.3*   ALKPHOS 264* 216* 215*   AST 1,946* 1,729* 1,751*   ALT 1,288* 1,068* 1,110*   ANIONGAP 10 7* 8   ESTGFRAFRICA >60 >60 >60   EGFRNONAA >60 >60 >60    and CBC   Recent Labs  Lab 07/03/18  1316   WBC 7.46   HGB 14.3   HCT 40.9          Pending Diagnostic Studies:     Procedure Component Value Units Date/Time    SANDEEP [[id number]] Collected:  07/03/18 1653    Order Status:  Sent Lab Status:  In process Updated:  07/03/18 2033    Specimen:  Blood from Blood     Anti-smooth muscle antibody [[id number]] Collected:  07/03/18 1653    Order Status:  Sent Lab Status:  In process Updated:  07/03/18 2033    Specimen:  Blood from Blood     Antimitochondrial antibody [[id number]] Collected:  07/03/18 1653    Order Status:  Sent Lab Status:  In process Updated:  07/03/18 2033    Specimen:   Blood from Blood     Hepatitis panel, acute [[id number]] Collected:  07/03/18 1444    Order Status:  Sent Lab Status:  In process Updated:  07/03/18 2033    Specimen:  Blood from Blood          Medications:  Reconciled Home Medications:      Medication List      START taking these medications    oxyCODONE 5 MG immediate release tablet  Commonly known as:  ROXICODONE  Take 1 tablet (5 mg total) by mouth every 12 (twelve) hours as needed.        STOP taking these medications    acetaminophen 500 MG tablet  Commonly known as:  TYLENOL            Indwelling Lines/Drains at time of discharge:   Lines/Drains/Airways          No matching active lines, drains, or airways          Time spent on the discharge of patient: >35 minutes  Patient was seen and examined on the date of discharge and determined to be suitable for discharge.      Deshaun Mayo NP  Department of Hospital Medicine  [facility]

## 2018-07-04 NOTE — PLAN OF CARE
Problem: Patient Care Overview  Goal: Plan of Care Review  Outcome: Ongoing (interventions implemented as appropriate)  Pt in bed AAOx4. Plan of Care reviewed, Verbalized understanding.  Patient remained free from falls, fall precautions in place.   VSS.   PIV CDI.   Call bell and personal belongings within reach. Hourly rounding complete. Reminded to call for assistance.   No complaints at this time. Will continue to monitor.

## 2018-07-05 DIAGNOSIS — B17.10 ACUTE HEPATITIS C VIRUS INFECTION WITHOUT HEPATIC COMA: ICD-10-CM

## 2018-07-05 DIAGNOSIS — B18.2 CHRONIC HEPATITIS C WITHOUT HEPATIC COMA: Primary | ICD-10-CM

## 2018-07-05 LAB
ANA SER QL IF: NORMAL
HAV IGM SERPL QL IA: NEGATIVE
HBV CORE IGM SERPL QL IA: NEGATIVE
HBV SURFACE AG SERPL QL IA: NEGATIVE
HCV AB SERPL QL IA: POSITIVE
MITOCHONDRIA AB TITR SER IF: NORMAL {TITER}

## 2018-07-05 NOTE — NURSING
PIV and telemetry monitor removed. Discharge instructions given to patient including medications and follow-up appointments. Patient verbalized understanding and was discharged per wheelchair to private vehicle in no acute distress.

## 2018-07-06 ENCOUNTER — TELEPHONE (OUTPATIENT)
Dept: GASTROENTEROLOGY | Facility: CLINIC | Age: 38
End: 2018-07-06

## 2018-07-06 LAB — SMOOTH MUSCLE AB TITR SER IF: ABNORMAL {TITER}

## 2018-07-06 NOTE — TELEPHONE ENCOUNTER
Unable to leave message. Pt's voice mailbox not set up. Pt needs to be advised of lab appt on 7/13/18

## 2018-07-07 ENCOUNTER — TELEPHONE (OUTPATIENT)
Dept: EMERGENCY MEDICINE | Facility: HOSPITAL | Age: 38
End: 2018-07-07

## 2018-07-07 NOTE — TELEPHONE ENCOUNTER
----- Message from Ana Devine MD sent at 7/6/2018  9:18 AM CDT -----  Patient needs to be notified of her positive results.  I think she already has appointment setup with GI outpatient but please make sure.

## 2019-10-08 ENCOUNTER — HOSPITAL ENCOUNTER (EMERGENCY)
Facility: HOSPITAL | Age: 39
Discharge: HOME | End: 2019-10-08
Attending: EMERGENCY MEDICINE
Payer: MEDICAID

## 2019-10-08 VITALS
DIASTOLIC BLOOD PRESSURE: 65 MMHG | BODY MASS INDEX: 38.78 KG/M2 | HEART RATE: 69 BPM | RESPIRATION RATE: 20 BRPM | TEMPERATURE: 98 F | OXYGEN SATURATION: 99 % | SYSTOLIC BLOOD PRESSURE: 106 MMHG | WEIGHT: 192 LBS

## 2019-10-08 DIAGNOSIS — K59.00 CONSTIPATION, UNSPECIFIED CONSTIPATION TYPE: ICD-10-CM

## 2019-10-08 DIAGNOSIS — R10.9 ABDOMINAL PAIN, UNSPECIFIED ABDOMINAL LOCATION: Primary | ICD-10-CM

## 2019-10-08 LAB
ALBUMIN SERPL BCP-MCNC: 3.6 G/DL (ref 3.5–5.2)
ALP SERPL-CCNC: 59 U/L (ref 55–135)
ALT SERPL W/O P-5'-P-CCNC: 18 U/L (ref 10–44)
ANION GAP SERPL CALC-SCNC: 13 MMOL/L (ref 8–16)
AST SERPL-CCNC: 19 U/L (ref 10–40)
B-HCG UR QL: NEGATIVE
BASOPHILS # BLD AUTO: 0.02 K/UL (ref 0–0.2)
BASOPHILS NFR BLD: 0.2 % (ref 0–1.9)
BILIRUB SERPL-MCNC: 0.2 MG/DL (ref 0.1–1)
BILIRUB UR QL STRIP: NEGATIVE
BUN SERPL-MCNC: 14 MG/DL (ref 6–20)
CALCIUM SERPL-MCNC: 9.1 MG/DL (ref 8.7–10.5)
CHLORIDE SERPL-SCNC: 104 MMOL/L (ref 95–110)
CLARITY UR REFRACT.AUTO: ABNORMAL
CO2 SERPL-SCNC: 21 MMOL/L (ref 23–29)
COLOR UR AUTO: YELLOW
CREAT SERPL-MCNC: 0.7 MG/DL (ref 0.5–1.4)
DIFFERENTIAL METHOD: ABNORMAL
EOSINOPHIL # BLD AUTO: 0.1 K/UL (ref 0–0.5)
EOSINOPHIL NFR BLD: 0.5 % (ref 0–8)
ERYTHROCYTE [DISTWIDTH] IN BLOOD BY AUTOMATED COUNT: 14.4 % (ref 11.5–14.5)
EST. GFR  (AFRICAN AMERICAN): >60 ML/MIN/1.73 M^2
EST. GFR  (NON AFRICAN AMERICAN): >60 ML/MIN/1.73 M^2
GLUCOSE SERPL-MCNC: 111 MG/DL (ref 70–110)
GLUCOSE UR QL STRIP: NEGATIVE
HCT VFR BLD AUTO: 41.1 % (ref 37–48.5)
HGB BLD-MCNC: 13.4 G/DL (ref 12–16)
HGB UR QL STRIP: NEGATIVE
HIV 1+2 AB+HIV1 P24 AG SERPL QL IA: NEGATIVE
IMM GRANULOCYTES # BLD AUTO: 0.04 K/UL (ref 0–0.04)
IMM GRANULOCYTES NFR BLD AUTO: 0.4 % (ref 0–0.5)
KETONES UR QL STRIP: NEGATIVE
LEUKOCYTE ESTERASE UR QL STRIP: NEGATIVE
LYMPHOCYTES # BLD AUTO: 1.7 K/UL (ref 1–4.8)
LYMPHOCYTES NFR BLD: 18.4 % (ref 18–48)
MCH RBC QN AUTO: 27.7 PG (ref 27–31)
MCHC RBC AUTO-ENTMCNC: 32.6 G/DL (ref 32–36)
MCV RBC AUTO: 85 FL (ref 82–98)
MICROSCOPIC COMMENT: NORMAL
MONOCYTES # BLD AUTO: 0.8 K/UL (ref 0.3–1)
MONOCYTES NFR BLD: 8.2 % (ref 4–15)
NEUTROPHILS # BLD AUTO: 6.6 K/UL (ref 1.8–7.7)
NEUTROPHILS NFR BLD: 72.3 % (ref 38–73)
NITRITE UR QL STRIP: NEGATIVE
NRBC BLD-RTO: 0 /100 WBC
PH UR STRIP: 7 [PH] (ref 5–8)
PLATELET # BLD AUTO: 353 K/UL (ref 150–350)
PMV BLD AUTO: 9.8 FL (ref 9.2–12.9)
POTASSIUM SERPL-SCNC: 4.6 MMOL/L (ref 3.5–5.1)
PROT SERPL-MCNC: 7.2 G/DL (ref 6–8.4)
PROT UR QL STRIP: NEGATIVE
RBC # BLD AUTO: 4.83 M/UL (ref 4–5.4)
RBC #/AREA URNS AUTO: 3 /HPF (ref 0–4)
SODIUM SERPL-SCNC: 138 MMOL/L (ref 136–145)
SP GR UR STRIP: 1.02 (ref 1–1.03)
SQUAMOUS #/AREA URNS AUTO: 1 /HPF
URN SPEC COLLECT METH UR: ABNORMAL
UROBILINOGEN UR STRIP-ACNC: NEGATIVE EU/DL
WBC # BLD AUTO: 9.14 K/UL (ref 3.9–12.7)

## 2019-10-08 PROCEDURE — 81000 URINALYSIS NONAUTO W/SCOPE: CPT | Mod: ER

## 2019-10-08 PROCEDURE — 99285 EMERGENCY DEPT VISIT HI MDM: CPT | Mod: 25,ER

## 2019-10-08 PROCEDURE — 81025 URINE PREGNANCY TEST: CPT | Mod: ER

## 2019-10-08 PROCEDURE — 25000003 PHARM REV CODE 250: Mod: ER | Performed by: EMERGENCY MEDICINE

## 2019-10-08 PROCEDURE — 85025 COMPLETE CBC W/AUTO DIFF WBC: CPT | Mod: ER

## 2019-10-08 PROCEDURE — 86703 HIV-1/HIV-2 1 RESULT ANTBDY: CPT

## 2019-10-08 PROCEDURE — 80053 COMPREHEN METABOLIC PANEL: CPT | Mod: ER

## 2019-10-08 RX ORDER — NAPROXEN 500 MG/1
500 TABLET ORAL
Status: COMPLETED | OUTPATIENT
Start: 2019-10-08 | End: 2019-10-08

## 2019-10-08 RX ORDER — POLYETHYLENE GLYCOL 3350 17 G/17G
17 POWDER, FOR SOLUTION ORAL DAILY
Qty: 119 G | Refills: 0 | Status: SHIPPED | OUTPATIENT
Start: 2019-10-08 | End: 2019-10-15

## 2019-10-08 RX ORDER — DOCUSATE SODIUM 100 MG/1
100 CAPSULE, LIQUID FILLED ORAL 3 TIMES DAILY PRN
Qty: 30 CAPSULE | Refills: 0 | Status: SHIPPED | OUTPATIENT
Start: 2019-10-08

## 2019-10-08 RX ADMIN — NAPROXEN 500 MG: 500 TABLET ORAL at 09:10

## 2019-10-08 NOTE — ED PROVIDER NOTES
Encounter Date: 10/8/2019       History     Chief Complaint   Patient presents with    Abdominal Pain     LLQ abodminal pain since , +N/V received 100mcg Fentanyl PTA via [facility], reports 0/10 pain upon arrival.      The history is provided by the patient.   Abdominal Pain   The current episode started today. The onset of the illness was gradual. The abdominal pain is located in the left flank. The abdominal pain does not radiate. The other symptoms of the illness include nausea and vomiting. The other symptoms of the illness do not include fever, shortness of breath or dysuria.   The emesis contains stomach contents.   Symptoms associated with the illness do not include chills, diaphoresis or back pain.     Review of patient's allergies indicates:  No Known Allergies  Past Medical History:   Diagnosis Date    Seizures      Past Surgical History:   Procedure Laterality Date     SECTION      x 8     SECTION      HERNIA REPAIR      TONSILLECTOMY       No family history on file.  Social History     Tobacco Use    Smoking status: Current Every Day Smoker     Packs/day: 0.25     Types: Cigarettes    Smokeless tobacco: Never Used   Substance Use Topics    Alcohol use: No    Drug use: Yes     Types: Marijuana     Review of Systems   Constitutional: Negative for chills, diaphoresis and fever.   HENT: Negative for sore throat.    Respiratory: Negative for shortness of breath.    Cardiovascular: Negative for chest pain.   Gastrointestinal: Positive for abdominal pain, nausea and vomiting.   Genitourinary: Negative for dysuria.   Musculoskeletal: Negative for back pain.   Skin: Negative for rash.   Neurological: Negative for weakness.   Hematological: Does not bruise/bleed easily.       Physical Exam     Initial Vitals [10/08/19 0724]   BP Pulse Resp Temp SpO2   138/72 65 18 97.6 °F (36.4 °C) 95 %      MAP       --         Physical Exam    Nursing note and vitals reviewed.  Constitutional: She  appears well-developed and well-nourished. No distress.   HENT:   Head: Normocephalic and atraumatic.   Mouth/Throat: Oropharynx is clear and moist.   Eyes: Conjunctivae and EOM are normal. Pupils are equal, round, and reactive to light.   Neck: Normal range of motion. Neck supple.   Cardiovascular: Normal rate, regular rhythm and normal heart sounds. Exam reveals no gallop and no friction rub.    No murmur heard.  Pulmonary/Chest: Breath sounds normal. No respiratory distress. She has no wheezes. She has no rhonchi. She has no rales.   Abdominal: Soft. Bowel sounds are normal. She exhibits no distension and no mass. There is no tenderness. There is no rebound and no guarding.   Musculoskeletal: Normal range of motion. She exhibits no edema or tenderness.   Neurological: She is alert and oriented to person, place, and time. She has normal strength.   Skin: Skin is warm and dry. No rash noted.   Psychiatric: She has a normal mood and affect. Thought content normal.         ED Course   Procedures  Labs Reviewed   CBC W/ AUTO DIFFERENTIAL - Abnormal; Notable for the following components:       Result Value    Platelets 353 (*)     All other components within normal limits   COMPREHENSIVE METABOLIC PANEL - Abnormal; Notable for the following components:    CO2 21 (*)     Glucose 111 (*)     All other components within normal limits   URINALYSIS, REFLEX TO URINE CULTURE - Abnormal; Notable for the following components:    Appearance, UA Cloudy (*)     All other components within normal limits    Narrative:     Preferred Collection Type->Urine, Clean Catch   PREGNANCY TEST, URINE RAPID   URINALYSIS MICROSCOPIC    Narrative:     Preferred Collection Type->Urine, Clean Catch   HIV 1 / 2 ANTIBODY          Imaging Results          CT Renal Stone Study ABD Pelvis WO (Final result)  Result time 10/08/19 09:08:09    Final result by Justen Mcknight MD (10/08/19 09:08:09)                 Impression:      No acute intra-abdominal  abnormality.  No evidence of nephrolithiasis or hydronephrosis.    Fat containing infraumbilical hernia.    Prominent stool within the cecum, ascending and transverse colon concerning for constipation.      Electronically signed by: Justen Mcknight  Date:    10/08/2019  Time:    09:08             Narrative:    EXAMINATION:  CT RENAL STONE STUDY ABD PELVIS WO    CLINICAL HISTORY:  Flank pain, stone disease suspected;    TECHNIQUE:  Low dose axial images, sagittal and coronal reformations were obtained from the lung bases to the pubic symphysis.  Contrast was not administered.    COMPARISON:  CT abdomen pelvis 08/13/2015    FINDINGS:  Heart: Normal in size. No pericardial effusion.    Lung Bases: Bibasilar atelectatic changes.  No effusion.    Liver: Normal in size and attenuation, with no focal hepatic lesions.    Gallbladder: No calcified gallstones.    Bile Ducts: No evidence of dilated ducts.    Pancreas: No mass or peripancreatic fat stranding.    Spleen: Unremarkable.    Adrenals: Unremarkable.    Kidneys/ Ureters: Normal in size and position.  No nephrolithiasis or hydronephrosis.  Ureters are nondilated.    Bladder: No evidence of wall thickening.    Reproductive organs: Uterus and adnexa appear within normal limits.    GI Tract/Mesentery: No evidence of bowel obstruction or inflammation.  Significant stool within the cecum.  Prominent stool seen within the ascending and transverse colon.  Appendix is visualized and is within normal limits.    Peritoneal Space: No ascites. No free air.    Retroperitoneum: No significant adenopathy.    Abdominal wall: Postsurgical changes of a prior lower midline incision.  There is a fat containing infraumbilical hernia.  Small adjacent area soft tissue thickening no fluid collection.    Vasculature: No significant atherosclerosis or aneurysm.    Bones: Minor degenerative changes.  No acute fracture or suspicious osseous process.                                     ED Vital  Signs:  Vitals:    10/08/19 0724 10/08/19 0921   BP: 138/72 (P) 106/65   Pulse: 65 (P) 69   Resp: 18 (P) 20   Temp: 97.6 °F (36.4 °C) (P) 97.5 °F (36.4 °C)   TempSrc: Oral (P) Oral   SpO2: 95% (P) 99%   Weight: 87.1 kg (192 lb)          Abnormal Lab Results:  Labs Reviewed   CBC W/ AUTO DIFFERENTIAL - Abnormal; Notable for the following components:       Result Value    Platelets 353 (*)     All other components within normal limits   COMPREHENSIVE METABOLIC PANEL - Abnormal; Notable for the following components:    CO2 21 (*)     Glucose 111 (*)     All other components within normal limits   URINALYSIS, REFLEX TO URINE CULTURE - Abnormal; Notable for the following components:    Appearance, UA Cloudy (*)     All other components within normal limits    Narrative:     Preferred Collection Type->Urine, Clean Catch   PREGNANCY TEST, URINE RAPID   URINALYSIS MICROSCOPIC    Narrative:     Preferred Collection Type->Urine, Clean Catch   HIV 1 / 2 ANTIBODY          All Lab Results:  Results for orders placed or performed during the hospital encounter of 10/08/19   CBC auto differential   Result Value Ref Range    WBC 9.14 3.90 - 12.70 K/uL    RBC 4.83 4.00 - 5.40 M/uL    Hemoglobin 13.4 12.0 - 16.0 g/dL    Hematocrit 41.1 37.0 - 48.5 %    Mean Corpuscular Volume 85 82 - 98 fL    Mean Corpuscular Hemoglobin 27.7 27.0 - 31.0 pg    Mean Corpuscular Hemoglobin Conc 32.6 32.0 - 36.0 g/dL    RDW 14.4 11.5 - 14.5 %    Platelets 353 (H) 150 - 350 K/uL    MPV 9.8 9.2 - 12.9 fL    Immature Granulocytes 0.4 0.0 - 0.5 %    Gran # (ANC) 6.6 1.8 - 7.7 K/uL    Immature Grans (Abs) 0.04 0.00 - 0.04 K/uL    Lymph # 1.7 1.0 - 4.8 K/uL    Mono # 0.8 0.3 - 1.0 K/uL    Eos # 0.1 0.0 - 0.5 K/uL    Baso # 0.02 0.00 - 0.20 K/uL    nRBC 0 0 /100 WBC    Gran% 72.3 38.0 - 73.0 %    Lymph% 18.4 18.0 - 48.0 %    Mono% 8.2 4.0 - 15.0 %    Eosinophil% 0.5 0.0 - 8.0 %    Basophil% 0.2 0.0 - 1.9 %    Differential Method Automated    Comprehensive  metabolic panel   Result Value Ref Range    Sodium 138 136 - 145 mmol/L    Potassium 4.6 3.5 - 5.1 mmol/L    Chloride 104 95 - 110 mmol/L    CO2 21 (L) 23 - 29 mmol/L    Glucose 111 (H) 70 - 110 mg/dL    BUN, Bld 14 6 - 20 mg/dL    Creatinine 0.7 0.5 - 1.4 mg/dL    Calcium 9.1 8.7 - 10.5 mg/dL    Total Protein 7.2 6.0 - 8.4 g/dL    Albumin 3.6 3.5 - 5.2 g/dL    Total Bilirubin 0.2 0.1 - 1.0 mg/dL    Alkaline Phosphatase 59 55 - 135 U/L    AST 19 10 - 40 U/L    ALT 18 10 - 44 U/L    Anion Gap 13 8 - 16 mmol/L    eGFR if African American >60.0 >60 mL/min/1.73 m^2    eGFR if non African American >60.0 >60 mL/min/1.73 m^2   Urinalysis, Reflex to Urine Culture Urine, Clean Catch   Result Value Ref Range    Specimen UA Urine, Clean Catch     Color, UA Yellow Yellow, Straw, [place]    Appearance, UA Cloudy (A) Clear    pH, UA 7.0 5.0 - 8.0    Specific Gravity, UA 1.020 1.005 - 1.030    Protein, UA Negative Negative    Glucose, UA Negative Negative    Ketones, UA Negative Negative    Bilirubin (UA) Negative Negative    Occult Blood UA Negative Negative    Nitrite, UA Negative Negative    Urobilinogen, UA Negative <2.0 EU/dL    Leukocytes, UA Negative Negative   Pregnancy, urine rapid   Result Value Ref Range    Preg Test, Ur Negative    Urinalysis Microscopic   Result Value Ref Range    RBC, UA 3 0 - 4 /hpf    Squam Epithel, UA 1 /hpf    Microscopic Comment SEE COMMENT            Imaging Results:  Imaging Results          CT Renal Stone Study ABD Pelvis WO (Final result)  Result time 10/08/19 09:08:09    Final result by Justen Mcknight MD (10/08/19 09:08:09)                 Impression:      No acute intra-abdominal abnormality.  No evidence of nephrolithiasis or hydronephrosis.    Fat containing infraumbilical hernia.    Prominent stool within the cecum, ascending and transverse colon concerning for constipation.      Electronically signed by: Justen Mcknight  Date:    10/08/2019  Time:    09:08             Narrative:     EXAMINATION:  CT RENAL STONE STUDY ABD PELVIS WO    CLINICAL HISTORY:  Flank pain, stone disease suspected;    TECHNIQUE:  Low dose axial images, sagittal and coronal reformations were obtained from the lung bases to the pubic symphysis.  Contrast was not administered.    COMPARISON:  CT abdomen pelvis 08/13/2015    FINDINGS:  Heart: Normal in size. No pericardial effusion.    Lung Bases: Bibasilar atelectatic changes.  No effusion.    Liver: Normal in size and attenuation, with no focal hepatic lesions.    Gallbladder: No calcified gallstones.    Bile Ducts: No evidence of dilated ducts.    Pancreas: No mass or peripancreatic fat stranding.    Spleen: Unremarkable.    Adrenals: Unremarkable.    Kidneys/ Ureters: Normal in size and position.  No nephrolithiasis or hydronephrosis.  Ureters are nondilated.    Bladder: No evidence of wall thickening.    Reproductive organs: Uterus and adnexa appear within normal limits.    GI Tract/Mesentery: No evidence of bowel obstruction or inflammation.  Significant stool within the cecum.  Prominent stool seen within the ascending and transverse colon.  Appendix is visualized and is within normal limits.    Peritoneal Space: No ascites. No free air.    Retroperitoneum: No significant adenopathy.    Abdominal wall: Postsurgical changes of a prior lower midline incision.  There is a fat containing infraumbilical hernia.  Small adjacent area soft tissue thickening no fluid collection.    Vasculature: No significant atherosclerosis or aneurysm.    Bones: Minor degenerative changes.  No acute fracture or suspicious osseous process.                                   The Emergency Provider reviewed the vital signs and test results, which are outlined above.    ED Discussions:  9:22 AM: Reassessed pt at this time.  Pt states her condition has improved at this time. Discussed with pt all pertinent ED information and results. Discussed pt dx of constipation and plan of tx. Gave pt all f/u  and return to the ED instructions. All questions and concerns were addressed at this time. Pt expresses understanding of information and instructions, and is comfortable with plan to discharge. Pt is stable for discharge.                              Clinical Impression:       ICD-10-CM ICD-9-CM   1. Abdominal pain, unspecified abdominal location R10.9 789.00   2. Constipation, unspecified constipation type K59.00 564.00           Disposition:   Disposition: Discharged  Condition: Stable                        Reji Kulkarni MD  10/08/19 0901

## 2019-10-14 ENCOUNTER — HOSPITAL ENCOUNTER (EMERGENCY)
Facility: HOSPITAL | Age: 39
Discharge: HOME | End: 2019-10-14
Attending: EMERGENCY MEDICINE
Payer: MEDICAID

## 2019-10-14 VITALS
DIASTOLIC BLOOD PRESSURE: 78 MMHG | HEART RATE: 73 BPM | OXYGEN SATURATION: 98 % | TEMPERATURE: 98 F | BODY MASS INDEX: 38.71 KG/M2 | WEIGHT: 192 LBS | HEIGHT: 59 IN | SYSTOLIC BLOOD PRESSURE: 163 MMHG | RESPIRATION RATE: 17 BRPM

## 2019-10-14 DIAGNOSIS — Z72.0 TOBACCO ABUSE: ICD-10-CM

## 2019-10-14 DIAGNOSIS — R10.32 LEFT LOWER QUADRANT ABDOMINAL PAIN: ICD-10-CM

## 2019-10-14 DIAGNOSIS — A59.9 TRICHOMONAS INFECTION: Primary | ICD-10-CM

## 2019-10-14 DIAGNOSIS — K59.00 CONSTIPATION, UNSPECIFIED CONSTIPATION TYPE: ICD-10-CM

## 2019-10-14 DIAGNOSIS — R03.0 ELEVATED BLOOD PRESSURE READING: ICD-10-CM

## 2019-10-14 LAB
ALBUMIN SERPL BCP-MCNC: 3.9 G/DL (ref 3.5–5.2)
ALP SERPL-CCNC: 64 U/L (ref 55–135)
ALT SERPL W/O P-5'-P-CCNC: 17 U/L (ref 10–44)
ANION GAP SERPL CALC-SCNC: 9 MMOL/L (ref 8–16)
AST SERPL-CCNC: 24 U/L (ref 10–40)
B-HCG UR QL: NEGATIVE
BACTERIA GENITAL QL WET PREP: ABNORMAL
BASOPHILS # BLD AUTO: 0.02 K/UL (ref 0–0.2)
BASOPHILS NFR BLD: 0.2 % (ref 0–1.9)
BILIRUB SERPL-MCNC: 0.5 MG/DL (ref 0.1–1)
BILIRUB UR QL STRIP: NEGATIVE
BUN SERPL-MCNC: 11 MG/DL (ref 6–20)
CALCIUM SERPL-MCNC: 9.2 MG/DL (ref 8.7–10.5)
CHLORIDE SERPL-SCNC: 105 MMOL/L (ref 95–110)
CLARITY UR REFRACT.AUTO: ABNORMAL
CLUE CELLS VAG QL WET PREP: ABNORMAL
CO2 SERPL-SCNC: 21 MMOL/L (ref 23–29)
COLOR UR AUTO: YELLOW
CREAT SERPL-MCNC: 0.7 MG/DL (ref 0.5–1.4)
DIFFERENTIAL METHOD: ABNORMAL
EOSINOPHIL # BLD AUTO: 0.1 K/UL (ref 0–0.5)
EOSINOPHIL NFR BLD: 0.5 % (ref 0–8)
ERYTHROCYTE [DISTWIDTH] IN BLOOD BY AUTOMATED COUNT: 14.3 % (ref 11.5–14.5)
EST. GFR  (AFRICAN AMERICAN): >60 ML/MIN/1.73 M^2
EST. GFR  (NON AFRICAN AMERICAN): >60 ML/MIN/1.73 M^2
FILAMENT FUNGI VAG WET PREP-#/AREA: ABNORMAL
GLUCOSE SERPL-MCNC: 94 MG/DL (ref 70–110)
GLUCOSE UR QL STRIP: NEGATIVE
HCT VFR BLD AUTO: 41 % (ref 37–48.5)
HGB BLD-MCNC: 13.6 G/DL (ref 12–16)
HGB UR QL STRIP: ABNORMAL
IMM GRANULOCYTES # BLD AUTO: 0.04 K/UL (ref 0–0.04)
IMM GRANULOCYTES NFR BLD AUTO: 0.4 % (ref 0–0.5)
KETONES UR QL STRIP: ABNORMAL
LEUKOCYTE ESTERASE UR QL STRIP: ABNORMAL
LIPASE SERPL-CCNC: 9 U/L (ref 4–60)
LYMPHOCYTES # BLD AUTO: 2.2 K/UL (ref 1–4.8)
LYMPHOCYTES NFR BLD: 21.5 % (ref 18–48)
MCH RBC QN AUTO: 27.6 PG (ref 27–31)
MCHC RBC AUTO-ENTMCNC: 33.2 G/DL (ref 32–36)
MCV RBC AUTO: 83 FL (ref 82–98)
MICROSCOPIC COMMENT: ABNORMAL
MONOCYTES # BLD AUTO: 0.7 K/UL (ref 0.3–1)
MONOCYTES NFR BLD: 6.9 % (ref 4–15)
NEUTROPHILS # BLD AUTO: 7.3 K/UL (ref 1.8–7.7)
NEUTROPHILS NFR BLD: 70.5 % (ref 38–73)
NITRITE UR QL STRIP: NEGATIVE
NRBC BLD-RTO: 0 /100 WBC
PH UR STRIP: 7 [PH] (ref 5–8)
PLATELET # BLD AUTO: 364 K/UL (ref 150–350)
PMV BLD AUTO: 10.3 FL (ref 9.2–12.9)
POTASSIUM SERPL-SCNC: 4.4 MMOL/L (ref 3.5–5.1)
PROT SERPL-MCNC: 7.8 G/DL (ref 6–8.4)
PROT UR QL STRIP: NEGATIVE
RBC # BLD AUTO: 4.92 M/UL (ref 4–5.4)
RBC #/AREA URNS AUTO: 2 /HPF (ref 0–4)
SODIUM SERPL-SCNC: 135 MMOL/L (ref 136–145)
SP GR UR STRIP: 1.01 (ref 1–1.03)
SPECIMEN SOURCE: ABNORMAL
SQUAMOUS #/AREA URNS AUTO: 10 /HPF
T VAGINALIS GENITAL QL WET PREP: ABNORMAL
TRICHOMONAS UR QL COMP ASSIST: ABNORMAL
URN SPEC COLLECT METH UR: ABNORMAL
UROBILINOGEN UR STRIP-ACNC: NEGATIVE EU/DL
WBC # BLD AUTO: 10.36 K/UL (ref 3.9–12.7)
WBC #/AREA URNS AUTO: 5 /HPF (ref 0–5)
WBC #/AREA VAG WET PREP: ABNORMAL
YEAST GENITAL QL WET PREP: ABNORMAL

## 2019-10-14 PROCEDURE — 63600175 PHARM REV CODE 636 W HCPCS: Mod: ER | Performed by: EMERGENCY MEDICINE

## 2019-10-14 PROCEDURE — 87491 CHLMYD TRACH DNA AMP PROBE: CPT

## 2019-10-14 PROCEDURE — 81025 URINE PREGNANCY TEST: CPT | Mod: ER

## 2019-10-14 PROCEDURE — 36556 INSERT NON-TUNNEL CV CATH: CPT | Mod: ER

## 2019-10-14 PROCEDURE — 99284 EMERGENCY DEPT VISIT MOD MDM: CPT | Mod: 25,ER

## 2019-10-14 PROCEDURE — 25000003 PHARM REV CODE 250: Mod: ER | Performed by: EMERGENCY MEDICINE

## 2019-10-14 PROCEDURE — 96372 THER/PROPH/DIAG INJ SC/IM: CPT | Mod: 59,ER

## 2019-10-14 PROCEDURE — 81000 URINALYSIS NONAUTO W/SCOPE: CPT | Mod: ER

## 2019-10-14 PROCEDURE — 85025 COMPLETE CBC W/AUTO DIFF WBC: CPT | Mod: ER

## 2019-10-14 PROCEDURE — 80053 COMPREHEN METABOLIC PANEL: CPT | Mod: ER

## 2019-10-14 PROCEDURE — 83690 ASSAY OF LIPASE: CPT | Mod: ER

## 2019-10-14 PROCEDURE — 87210 SMEAR WET MOUNT SALINE/INK: CPT | Mod: ER

## 2019-10-14 PROCEDURE — 96374 THER/PROPH/DIAG INJ IV PUSH: CPT | Mod: ER,59

## 2019-10-14 RX ORDER — DICYCLOMINE HYDROCHLORIDE 10 MG/ML
20 INJECTION INTRAMUSCULAR
Status: COMPLETED | OUTPATIENT
Start: 2019-10-14 | End: 2019-10-14

## 2019-10-14 RX ORDER — KETOROLAC TROMETHAMINE 30 MG/ML
30 INJECTION, SOLUTION INTRAMUSCULAR; INTRAVENOUS
Status: COMPLETED | OUTPATIENT
Start: 2019-10-14 | End: 2019-10-14

## 2019-10-14 RX ORDER — METRONIDAZOLE 500 MG/1
500 TABLET ORAL
Status: COMPLETED | OUTPATIENT
Start: 2019-10-14 | End: 2019-10-14

## 2019-10-14 RX ORDER — IBUPROFEN 600 MG/1
600 TABLET ORAL EVERY 6 HOURS PRN
Qty: 28 TABLET | Refills: 0 | Status: SHIPPED | OUTPATIENT
Start: 2019-10-14

## 2019-10-14 RX ORDER — ONDANSETRON 4 MG/1
4 TABLET, ORALLY DISINTEGRATING ORAL
Status: COMPLETED | OUTPATIENT
Start: 2019-10-14 | End: 2019-10-14

## 2019-10-14 RX ORDER — ONDANSETRON 2 MG/ML
4 INJECTION INTRAMUSCULAR; INTRAVENOUS ONCE
Status: DISCONTINUED | OUTPATIENT
Start: 2019-10-14 | End: 2019-10-14

## 2019-10-14 RX ORDER — METRONIDAZOLE 500 MG/1
500 TABLET ORAL EVERY 12 HOURS
Qty: 13 TABLET | Refills: 0 | Status: SHIPPED | OUTPATIENT
Start: 2019-10-14 | End: 2019-10-21

## 2019-10-14 RX ADMIN — KETOROLAC TROMETHAMINE 30 MG: 30 INJECTION, SOLUTION INTRAMUSCULAR at 01:10

## 2019-10-14 RX ADMIN — DICYCLOMINE HYDROCHLORIDE 20 MG: 20 INJECTION, SOLUTION INTRAMUSCULAR at 12:10

## 2019-10-14 RX ADMIN — ONDANSETRON 4 MG: 4 TABLET, ORALLY DISINTEGRATING ORAL at 12:10

## 2019-10-14 RX ADMIN — METRONIDAZOLE 500 MG: 500 TABLET ORAL at 02:10

## 2019-10-14 NOTE — DISCHARGE INSTRUCTIONS
ConsiderTaking over-the-counter Colace as directed    Continue MiraLax.    Your blood pressure was elevated in the ED.  You may have high blood pressure.   Keep a log of your blood pressure and follow up with a Primary Care Provider within the next two weeks. Call [phone] for appointment.

## 2019-10-14 NOTE — ED PROVIDER NOTES
History     Chief Complaint   Patient presents with    Abdominal Pain     x7 days; pt seen in ed last week and dx with constipation; last bm today; pt states it feels like her hernia mesh       Review of patient's allergies indicates:  No Known Allergies    History of Present Illness   HPI    10/14/2019, 11:41 AM    The history is provided by the patient and HOPE Leos is a 39 y.o. female presenting to the ED for abdominal pain.   Onset approximately week ago.  Patient was seen in the emergency department on 2019 head CT scan without contrast and was diagnosed with constipation.  Patient was treated with MiraLax.  She reports drinking a clear bottle twice a day and having a bowel movement.  She notes the bowel movements are still hard to pass.  Today, she is experiencing pain to the left lower quadrant.  It does not radiate.  It is unknown what makes it better what makes it worse.  Pain is rated 8/10.  It is associated with emesis.  Patient states that she had 4 episodes of vomiting yesterday.  Patient denies any hematochezia, hematemesis, melena, dysuria, urgency, frequency, diarrhea, fever, cough, vaginal discharge. Patient states that she was able to pass gas this morning.  Patient reports that she has had previous C-sections in hernia repair.  She is concerned that something might be wrong with her mesh.      Arrival mode:  [facility]    PCP: Von Rios MD     Allergies:  Review of patient's allergies indicates:  No Known Allergies    Past Medical History:  Past Medical History:   Diagnosis Date    Hepatitis     HEP C    Seizures        Past Surgical History:  Past Surgical History:   Procedure Laterality Date     SECTION      x 8     SECTION      HERNIA REPAIR      TONSILLECTOMY           Family History:  History reviewed. No pertinent family history.    Social History:  Social History     Tobacco Use    Smoking status: Current Every Day Smoker      "Packs/day: 0.25     Types: Cigarettes    Smokeless tobacco: Never Used   Substance and Sexual Activity    Alcohol use: No    Drug use: Yes     Types: Marijuana     Comment: LAST TIME "A COUPLE DAYS AGO"    Sexual activity: Yes     Partners: Male        Review of Systems   Review of Systems   Constitutional: Negative for fever.   HENT: Negative for sore throat.    Respiratory: Negative for shortness of breath.    Cardiovascular: Negative for chest pain.   Gastrointestinal: Positive for abdominal pain, constipation, nausea and vomiting (x 4). Negative for abdominal distention and diarrhea.   Genitourinary: Negative for decreased urine volume, difficulty urinating, dysuria, frequency, urgency and vaginal discharge.   Musculoskeletal: Negative for back pain.   Skin: Negative for rash.   Neurological: Negative for weakness.   Hematological: Does not bruise/bleed easily.        Physical Exam     Initial Vitals [10/14/19 1137]   BP Pulse Resp Temp SpO2   113/76 63 20 98.4 °F (36.9 °C) 100 %      MAP       --          Physical Exam    Nursing Notes and Vital Signs Reviewed.  Constitutional: Patient is in no apparent distress. Well-developed and well-nourished.  Patient is moaning on the gurney.  Head: Atraumatic. Normocephalic.  Eyes: PERRL. EOM intact. Conjunctivae are not pale. No scleral icterus.  ENT: Mucous membranes are moist. Oropharynx is clear and symmetric.    Neck: Supple. Full ROM. No lymphadenopathy.  Cardiovascular: Regular rate. Regular rhythm. No murmurs, rubs, or gallops. Distal pulses are 2+ and symmetric.  Pulmonary/Chest: No respiratory distress. Clear to auscultation bilaterally. No wheezing or rales.  Abdominal: Soft and non-distended.  There is LLQ tenderness.  No rebound, guarding, or rigidity. Good bowel sounds.  Genitourinary: No CVA tenderness  Musculoskeletal: Moves all extremities. No obvious deformities. No edema. No calf tenderness.  Skin: Warm and dry. She has several areas skin " "excoriation and a healing scabs of various ages on her forearms, abdomen, and face.  Neurological:  Alert, awake, and appropriate.  Normal speech.  No acute focal neurological deficits are appreciated.  Psychiatric: Normal affect. Good eye contact. Appropriate in content.    1:20 PM external pelvic exam:  Caron Jack:  Normal external exam.  There is white non adherent discharge. No cervical motion tenderness noted. No uterine enlargement appreciated Tenderness palpation left lower abdominal quadrant.   ED Course     External Jugular IV  Date/Time: 10/14/2019 12:33 PM  Performed by: Ysabel Foote DO  Authorized by: Ysabel Foote DO   Location (Ext Jugular): Right.  Area Prepped With: Chlorohexidine.  Catheter Size: 18 ga.  Catheter Type: Jelco.  Number of attempts: 2  Fixation/Dressing: Tegaderm.  Patient tolerance: Patient tolerated the procedure well with no immediate complications          ED Vital Signs:  Vitals:    10/14/19 1137 10/14/19 1139 10/14/19 1238 10/14/19 1246   BP: 113/76 113/76 137/75 127/84   Pulse: 63 68  71   Resp: 20      Temp: 98.4 °F (36.9 °C)      TempSrc: Oral      SpO2: 100% 100%  98%   Weight: 87.1 kg (192 lb 0.3 oz)      Height: 4' 11" (1.499 m)       10/14/19 1355 10/14/19 1401 10/14/19 1416 10/14/19 1433   BP: (!) 147/70 (!) 145/66 (!) 136/56 (!) 163/78   Pulse: (!) 53 (!) 58 (!) 56 (!) 53   Resp: 16 16 16 16   Temp:       TempSrc:       SpO2: (!) 89% (!) 85% 95% 100%   Weight:       Height:        10/14/19 1435   BP: (!) 163/78   Pulse: 73   Resp: 17   Temp:    TempSrc:    SpO2: 98%   Weight:    Height:        Abnormal Lab Results:  Labs Reviewed   COMPREHENSIVE METABOLIC PANEL - Abnormal; Notable for the following components:       Result Value    Sodium 135 (*)     CO2 21 (*)     All other components within normal limits   URINALYSIS, REFLEX TO URINE CULTURE - Abnormal; Notable for the following components:    Appearance, UA Cloudy (*)     Ketones, UA Trace " (*)     Occult Blood UA Trace (*)     Leukocytes, UA 2+ (*)     All other components within normal limits    Narrative:     Preferred Collection Type->Urine, Clean Catch   URINALYSIS MICROSCOPIC - Abnormal; Notable for the following components:    Trichomonas, UA Rare (*)     All other components within normal limits    Narrative:     Preferred Collection Type->Urine, Clean Catch   CBC W/ AUTO DIFFERENTIAL - Abnormal; Notable for the following components:    Platelets 364 (*)     All other components within normal limits   VAGINAL SCREEN - Abnormal; Notable for the following components:    Trichomonas Occasional (*)     Clue Cells Occasional (*)     WBC - Vaginal Screen Occasional (*)     Bacteria - Vaginal Screen Few (*)     All other components within normal limits   C. TRACHOMATIS/N. GONORRHOEAE BY AMP DNA   LIPASE   PREGNANCY TEST, URINE RAPID        All Lab Results:  Results for orders placed or performed during the hospital encounter of 10/14/19   Comprehensive metabolic panel   Result Value Ref Range    Sodium 135 (L) 136 - 145 mmol/L    Potassium 4.4 3.5 - 5.1 mmol/L    Chloride 105 95 - 110 mmol/L    CO2 21 (L) 23 - 29 mmol/L    Glucose 94 70 - 110 mg/dL    BUN, Bld 11 6 - 20 mg/dL    Creatinine 0.7 0.5 - 1.4 mg/dL    Calcium 9.2 8.7 - 10.5 mg/dL    Total Protein 7.8 6.0 - 8.4 g/dL    Albumin 3.9 3.5 - 5.2 g/dL    Total Bilirubin 0.5 0.1 - 1.0 mg/dL    Alkaline Phosphatase 64 55 - 135 U/L    AST 24 10 - 40 U/L    ALT 17 10 - 44 U/L    Anion Gap 9 8 - 16 mmol/L    eGFR if African American >60.0 >60 mL/min/1.73 m^2    eGFR if non African American >60.0 >60 mL/min/1.73 m^2   Lipase   Result Value Ref Range    Lipase 9 4 - 60 U/L   Urinalysis, Reflex to Urine Culture Urine, Clean Catch   Result Value Ref Range    Specimen UA Urine, Clean Catch     Color, UA Yellow Yellow, Straw, Emilee    Appearance, UA Cloudy (A) Clear    pH, UA 7.0 5.0 - 8.0    Specific Gravity, UA 1.010 1.005 - 1.030    Protein, UA Negative  Negative    Glucose, UA Negative Negative    Ketones, UA Trace (A) Negative    Bilirubin (UA) Negative Negative    Occult Blood UA Trace (A) Negative    Nitrite, UA Negative Negative    Urobilinogen, UA Negative <2.0 EU/dL    Leukocytes, UA 2+ (A) Negative   Pregnancy, urine rapid   Result Value Ref Range    Preg Test, Ur Negative    Urinalysis Microscopic   Result Value Ref Range    RBC, UA 2 0 - 4 /hpf    WBC, UA 5 0 - 5 /hpf    Squam Epithel, UA 10 /hpf    Trichomonas, UA Rare (A) None    Microscopic Comment SEE COMMENT    CBC auto differential   Result Value Ref Range    WBC 10.36 3.90 - 12.70 K/uL    RBC 4.92 4.00 - 5.40 M/uL    Hemoglobin 13.6 12.0 - 16.0 g/dL    Hematocrit 41.0 37.0 - 48.5 %    Mean Corpuscular Volume 83 82 - 98 fL    Mean Corpuscular Hemoglobin 27.6 27.0 - 31.0 pg    Mean Corpuscular Hemoglobin Conc 33.2 32.0 - 36.0 g/dL    RDW 14.3 11.5 - 14.5 %    Platelets 364 (H) 150 - 350 K/uL    MPV 10.3 9.2 - 12.9 fL    Immature Granulocytes 0.4 0.0 - 0.5 %    Gran # (ANC) 7.3 1.8 - 7.7 K/uL    Immature Grans (Abs) 0.04 0.00 - 0.04 K/uL    Lymph # 2.2 1.0 - 4.8 K/uL    Mono # 0.7 0.3 - 1.0 K/uL    Eos # 0.1 0.0 - 0.5 K/uL    Baso # 0.02 0.00 - 0.20 K/uL    nRBC 0 0 /100 WBC    Gran% 70.5 38.0 - 73.0 %    Lymph% 21.5 18.0 - 48.0 %    Mono% 6.9 4.0 - 15.0 %    Eosinophil% 0.5 0.0 - 8.0 %    Basophil% 0.2 0.0 - 1.9 %    Differential Method Automated    Vaginal Screen   Result Value Ref Range    Trichomonas Occasional (A) None    Clue Cells Occasional (A) None    Budding Yeast None None    Fungal Hyphae None None    WBC - Vaginal Screen Occasional (A) None    Bacteria - Vaginal Screen Few (A) None    Wet Prep Source VAG                Imaging Results:  Imaging Results          CT Abdomen Pelvis  Without Contrast (Final result)  Result time 10/14/19 13:33:05   Procedure changed from CT Abdomen Pelvis With Contrast     Final result by Wilfredo Broderick MD (10/14/19 13:33:05)                 Impression:       No acute abnormality identified.  No bowel obstruction, intra-abdominal abscess, free fluid, or free air.      Electronically signed by: Wilfredo Broderick  Date:    10/14/2019  Time:    13:33             Narrative:    EXAMINATION:  CT ABDOMEN PELVIS WITHOUT CONTRAST    CLINICAL HISTORY:  LLQ pain, suspect diverticulitis;    TECHNIQUE:  Low dose axial images, sagittal and coronal reformations were obtained from the lung bases to the pubic symphysis.  Contrast was not administered.    All CT scans at this location are performed using dose modulation techniques as appropriate to a performed exam including the following: Automated exposure control; adjustment of the mA and/or kV according to patient size.    COMPARISON:  10/08/2019    FINDINGS:  Heart: Normal in size. No pericardial effusion.    Lung Bases: Well aerated, without consolidation or pleural fluid.    Liver: Normal in size and attenuation, with no focal hepatic lesions.    Gallbladder: No calcified gallstones.    Bile Ducts: No evidence of dilated ducts.    Pancreas: No mass or peripancreatic fat stranding.    Spleen: Unremarkable.    Adrenals: Unremarkable.    Kidneys/ Ureters: Unremarkable.    Bladder: No evidence of wall thickening.    Reproductive organs: Unremarkable.    GI Tract/Mesentery: No evidence of bowel obstruction or inflammation.  Normal appendix.  No evidence of diverticulitis.  Moderate volume stool cecum, right colon, transverse colon.    Peritoneal Space: No ascites. No free air.    Retroperitoneum: No significant adenopathy.    Abdominal wall: Unchanged fat containing infraumbilical ventral hernia, uncomplicated.  Unchanged soft tissue thickening underlying prior infraumbilical midline incision.    Vasculature: No significant atherosclerosis or aneurysm.    Bones: No acute fracture.                                 The Emergency Provider reviewed the vital signs and test results, which are outlined above.     ED Discussion     ED Course as of  Oct 14 1500   Mon Oct 14, 2019   1234 Unable to secure peripheral IV.  Will to EJ instead.  Therefore will do a noncontrast CT of abdomen pelvis.    [LB]   1321 Repeat abdominal Exam, soft, no rebound or guarding no masses appreciated.    [LB]   1353 Discussed results of tests with patient.  Repeat abdominal exam soft, no rebound or guarding no masses. Patient is sleeping.  Patient was woken up with verbal stimulus.  All questions answered.    [LB]      ED Course User Index  [LB] Ysabel Foote, DO     1:54 PM  Reassessment: Dr. Foote reassessed the pt.  The pt is resting comfortably and is NAD.  Pt states their sx have improved. Discussed test results, shared treatment plan, specific conditions for return, and the need for f/u.  Answered their questions at this time.  Pt understands and agrees to the plan.  The pt has remained hemodynamically stable through ED course and is stable for discharge.      I discussed with patient and/or family/caretaker that evaluation in the ED does not suggest any emergent or life threatening medical conditions requiring immediate intervention beyond what was provided in the ED, and I believe patient is safe for discharge.  Regardless, an unremarkable evaluation in the ED does not preclude the development or presence of a serious of life threatening condition. As such, patient was instructed to return immediately for any worsening or change in current symptoms.      ED Medication(s):  Medications   dicyclomine injection 20 mg (20 mg Intramuscular Given 10/14/19 1246)   ondansetron disintegrating tablet 4 mg (4 mg Oral Given 10/14/19 1245)   ketorolac injection 30 mg (30 mg Intravenous Given 10/14/19 1305)   metroNIDAZOLE tablet 500 mg (500 mg Oral Given 10/14/19 1441)          Medication List      START taking these medications    ibuprofen 600 MG tablet  Commonly known as:  ADVIL,MOTRIN  Take 1 tablet (600 mg total) by mouth every 6 (six) hours as needed for Pain.    "  metroNIDAZOLE 500 MG tablet  Commonly known as:  FLAGYL  Take 1 tablet (500 mg total) by mouth every 12 (twelve) hours. for 7 days        ASK your doctor about these medications    docusate sodium 100 MG capsule  Commonly known as:  COLACE  Take 1 capsule (100 mg total) by mouth 3 (three) times daily as needed for Constipation.     polyethylene glycol 17 gram/dose powder  Commonly known as:  GLYCOLAX  Take 17 g by mouth once daily. for 7 days           Where to Get Your Medications      You can get these medications from any pharmacy    Bring a paper prescription for each of these medications  · ibuprofen 600 MG tablet  · metroNIDAZOLE 500 MG tablet         Follow-up Information     Von Rios MD In 2 days.    Specialty:  Pediatrics  Why:  Return to emergency department for:  Worsening pain, fever, vomiting, or other concerns.  Contact information:  [address] [facility] [address] [phone]                        MIPS Measures     Smoker? Yes     Hypertension: None         Medical Decision Making     Medical Decision Making:   Clinical Tests:   Lab Tests: Ordered and Reviewed  Radiological Study: Ordered and Reviewed       Additional MDM:   Smoking Cessation: The patient is a smoker. The patient was counseled on smoking cessation for: 3 minutes. The patient was counseled on tobacco related  health complications. Appropriate patient literature was given to the patient concerning tobacco cessation.        MDM  Reviewed: vitals and nursing note        Portions of this note may have been created with voice recognition software. Occasional "wrong-word" or "sound-a-like" substitutions may have occurred due to the inherent limitations of voice recognition software. Please, read the note carefully and recognize, using context, where substitutions have occurred.        Clinical Impression       ICD-10-CM ICD-9-CM   1. Trichomonas infection A59.9 131.9   2. Tobacco abuse Z72.0 " 305.1   3. Constipation, unspecified constipation type K59.00 564.00   4. Left lower quadrant abdominal pain R10.32 789.04   5. Elevated blood pressure reading R03.0 796.2       Disposition:   Disposition: Discharged  Condition: Stable                 Ysabel Foote,   10/14/19 1507

## 2019-10-14 NOTE — ED NOTES
"Patient complains of abd pain. Reports onset of symptoms 1 week, however pt recently seen in ED (10/8, Dx with constipation, pt states she has not taken all her medication as prescribed from that visit). Patient states it feels like her "hernia mesh".     Level of Consciousness: Patient is awake, alert, and oriented to person, place, time, and situation. Speech is clear. Pt is moaning loudly.  HEENT: Symmetrical face, PERRLA, moist mucous membranes, no congestion/drainage, no JVD, pt able to swallow.  Appearance: Patient resting in bed, clothing intact.  Skin: Skin is warm, dry, and intact. Skin is of normal color. Patient has generalized sores. Mucus membranes pink and moist.   Musculoskeletal: Moves all extremities well. Full active ROM. No deformities noted. Denies any weakness. Gait steady, patient ambulates independently, without assistive device.   Respiratory: Airway open and patent. Respirations equal and unlabored. Lung sounds clear upon auscultation. Patient denies any SOB.   Cardiac: Regular rate and rhythm. No peripheral edema noted to bilateral lower extremities. Denies any chest pain or discomfort.   GI: Abdomen soft, non-tender. Bowel sounds present and active in all quadrants x 4. No distention noted. Patient states she has been nauseated. Pt states she has had a decreased intake of fluid and food. Patient states she has had a BM today, but was small. Pt has LLQ pain.  : Denies any problem with urination.  Neurological: Normal sensation reported to all extremities. Symmetrical expressions noted to face. No obvious neurological deficits noted.   Psychosocial: Patient is moaning, no family at bedside.    Patient informed of plan of care, verbalizes understanding, and has no questions or concern at this time. Bed is in the lowest position and locked. Call bell within reach of the patient. Will continue to monitor.   "

## 2019-10-15 LAB
C TRACH DNA SPEC QL NAA+PROBE: NOT DETECTED
N GONORRHOEA DNA SPEC QL NAA+PROBE: NOT DETECTED

## 2020-06-29 ENCOUNTER — HOSPITAL ENCOUNTER (EMERGENCY)
Facility: HOSPITAL | Age: 40
Discharge: HOME | End: 2020-06-30
Attending: EMERGENCY MEDICINE
Payer: MEDICAID

## 2020-06-29 DIAGNOSIS — B34.9 VIRAL ILLNESS: ICD-10-CM

## 2020-06-29 DIAGNOSIS — R50.9 FEVER: ICD-10-CM

## 2020-06-29 DIAGNOSIS — R11.2 NON-INTRACTABLE VOMITING WITH NAUSEA, UNSPECIFIED VOMITING TYPE: Primary | ICD-10-CM

## 2020-06-29 LAB
ALBUMIN SERPL BCP-MCNC: 3.2 G/DL (ref 3.5–5.2)
ALP SERPL-CCNC: 290 U/L (ref 55–135)
ALT SERPL W/O P-5'-P-CCNC: 239 U/L (ref 10–44)
ANION GAP SERPL CALC-SCNC: 11 MMOL/L (ref 8–16)
AST SERPL-CCNC: 228 U/L (ref 10–40)
BACTERIA #/AREA URNS AUTO: NORMAL /HPF
BILIRUB SERPL-MCNC: 0.5 MG/DL (ref 0.1–1)
BILIRUB UR QL STRIP: ABNORMAL
BUN SERPL-MCNC: 10 MG/DL (ref 6–20)
CALCIUM SERPL-MCNC: 8.5 MG/DL (ref 8.7–10.5)
CHLORIDE SERPL-SCNC: 101 MMOL/L (ref 95–110)
CLARITY UR REFRACT.AUTO: ABNORMAL
CO2 SERPL-SCNC: 23 MMOL/L (ref 23–29)
COLOR UR AUTO: YELLOW
CREAT SERPL-MCNC: 0.8 MG/DL (ref 0.5–1.4)
EST. GFR  (AFRICAN AMERICAN): >60 ML/MIN/1.73 M^2
EST. GFR  (NON AFRICAN AMERICAN): >60 ML/MIN/1.73 M^2
GLUCOSE SERPL-MCNC: 115 MG/DL (ref 70–110)
GLUCOSE UR QL STRIP: NEGATIVE
HGB UR QL STRIP: NEGATIVE
HYALINE CASTS UR QL AUTO: 0 /LPF
KETONES UR QL STRIP: NEGATIVE
LACTATE SERPL-SCNC: 2.2 MMOL/L (ref 0.5–2.2)
LEUKOCYTE ESTERASE UR QL STRIP: NEGATIVE
LIPASE SERPL-CCNC: 8 U/L (ref 4–60)
MICROSCOPIC COMMENT: NORMAL
NITRITE UR QL STRIP: NEGATIVE
PH UR STRIP: 7 [PH] (ref 5–8)
POTASSIUM SERPL-SCNC: 3.6 MMOL/L (ref 3.5–5.1)
PROT SERPL-MCNC: 7.2 G/DL (ref 6–8.4)
PROT UR QL STRIP: ABNORMAL
RBC #/AREA URNS AUTO: 1 /HPF (ref 0–4)
SARS-COV-2 RDRP RESP QL NAA+PROBE: NEGATIVE
SODIUM SERPL-SCNC: 135 MMOL/L (ref 136–145)
SP GR UR STRIP: 1.02 (ref 1–1.03)
SQUAMOUS #/AREA URNS AUTO: 2 /HPF
URN SPEC COLLECT METH UR: ABNORMAL
UROBILINOGEN UR STRIP-ACNC: ABNORMAL EU/DL
WBC #/AREA URNS AUTO: 1 /HPF (ref 0–5)

## 2020-06-29 PROCEDURE — 25000003 PHARM REV CODE 250: Mod: ER | Performed by: EMERGENCY MEDICINE

## 2020-06-29 PROCEDURE — 96361 HYDRATE IV INFUSION ADD-ON: CPT | Mod: ER

## 2020-06-29 PROCEDURE — 87040 BLOOD CULTURE FOR BACTERIA: CPT

## 2020-06-29 PROCEDURE — 83690 ASSAY OF LIPASE: CPT | Mod: ER

## 2020-06-29 PROCEDURE — 83605 ASSAY OF LACTIC ACID: CPT | Mod: ER

## 2020-06-29 PROCEDURE — 51798 US URINE CAPACITY MEASURE: CPT | Mod: ER

## 2020-06-29 PROCEDURE — 85027 COMPLETE CBC AUTOMATED: CPT | Mod: ER

## 2020-06-29 PROCEDURE — 96375 TX/PRO/DX INJ NEW DRUG ADDON: CPT | Mod: ER

## 2020-06-29 PROCEDURE — 99284 EMERGENCY DEPT VISIT MOD MDM: CPT | Mod: 25,ER

## 2020-06-29 PROCEDURE — U0002 COVID-19 LAB TEST NON-CDC: HCPCS | Mod: ER

## 2020-06-29 PROCEDURE — 81000 URINALYSIS NONAUTO W/SCOPE: CPT | Mod: ER

## 2020-06-29 PROCEDURE — 80053 COMPREHEN METABOLIC PANEL: CPT | Mod: ER

## 2020-06-29 PROCEDURE — 96374 THER/PROPH/DIAG INJ IV PUSH: CPT | Mod: ER

## 2020-06-29 PROCEDURE — 96376 TX/PRO/DX INJ SAME DRUG ADON: CPT | Mod: ER

## 2020-06-29 PROCEDURE — 85007 BL SMEAR W/DIFF WBC COUNT: CPT | Mod: ER

## 2020-06-29 RX ADMIN — SODIUM CHLORIDE 1000 ML: 0.9 INJECTION, SOLUTION INTRAVENOUS at 10:06

## 2020-06-30 VITALS
SYSTOLIC BLOOD PRESSURE: 118 MMHG | BODY MASS INDEX: 40.6 KG/M2 | RESPIRATION RATE: 18 BRPM | HEART RATE: 70 BPM | DIASTOLIC BLOOD PRESSURE: 63 MMHG | WEIGHT: 201.38 LBS | HEIGHT: 59 IN | OXYGEN SATURATION: 99 % | TEMPERATURE: 98 F

## 2020-06-30 LAB
BASOPHILS NFR BLD: 0 % (ref 0–1.9)
DIFFERENTIAL METHOD: ABNORMAL
EOSINOPHIL NFR BLD: 2 % (ref 0–8)
ERYTHROCYTE [DISTWIDTH] IN BLOOD BY AUTOMATED COUNT: 17 % (ref 11.5–14.5)
HCT VFR BLD AUTO: 34.4 % (ref 37–48.5)
HGB BLD-MCNC: 10.9 G/DL (ref 12–16)
IMM GRANULOCYTES # BLD AUTO: ABNORMAL K/UL (ref 0–0.04)
IMM GRANULOCYTES NFR BLD AUTO: ABNORMAL % (ref 0–0.5)
LYMPHOCYTES NFR BLD: 9 % (ref 18–48)
MCH RBC QN AUTO: 22.9 PG (ref 27–31)
MCHC RBC AUTO-ENTMCNC: 31.7 G/DL (ref 32–36)
MCV RBC AUTO: 72 FL (ref 82–98)
MONOCYTES NFR BLD: 12 % (ref 4–15)
NEUTROPHILS NFR BLD: 76 % (ref 38–73)
NEUTS BAND NFR BLD MANUAL: 1 %
NRBC BLD-RTO: 0 /100 WBC
PLATELET # BLD AUTO: 225 K/UL (ref 150–350)
PMV BLD AUTO: 9.7 FL (ref 9.2–12.9)
RBC # BLD AUTO: 4.76 M/UL (ref 4–5.4)
WBC # BLD AUTO: 3.81 K/UL (ref 3.9–12.7)

## 2020-06-30 PROCEDURE — 63600175 PHARM REV CODE 636 W HCPCS: Mod: ER | Performed by: EMERGENCY MEDICINE

## 2020-06-30 RX ORDER — HYOSCYAMINE SULFATE 0.12 MG/1
0.12 TABLET SUBLINGUAL EVERY 6 HOURS PRN
Qty: 12 TABLET | Refills: 0 | Status: SHIPPED | OUTPATIENT
Start: 2020-06-30

## 2020-06-30 RX ORDER — ONDANSETRON 2 MG/ML
4 INJECTION INTRAMUSCULAR; INTRAVENOUS
Status: COMPLETED | OUTPATIENT
Start: 2020-06-30 | End: 2020-06-30

## 2020-06-30 RX ORDER — MORPHINE SULFATE 4 MG/ML
2 INJECTION, SOLUTION INTRAMUSCULAR; INTRAVENOUS
Status: COMPLETED | OUTPATIENT
Start: 2020-06-30 | End: 2020-06-30

## 2020-06-30 RX ORDER — ONDANSETRON 4 MG/1
4 TABLET, FILM COATED ORAL EVERY 6 HOURS PRN
Qty: 12 TABLET | Refills: 0 | Status: SHIPPED | OUTPATIENT
Start: 2020-06-30

## 2020-06-30 RX ADMIN — ONDANSETRON 4 MG: 2 INJECTION, SOLUTION INTRAMUSCULAR; INTRAVENOUS at 12:06

## 2020-06-30 RX ADMIN — MORPHINE SULFATE 2 MG: 4 INJECTION, SOLUTION INTRAMUSCULAR; INTRAVENOUS at 12:06

## 2020-06-30 RX ADMIN — ONDANSETRON 4 MG: 2 INJECTION INTRAMUSCULAR; INTRAVENOUS at 07:06

## 2020-06-30 NOTE — ED PROVIDER NOTES
"Encounter Date: 2020       History     Chief Complaint   Patient presents with    Weakness     c/o N/V, weakness, abdominal pain, urinary retention x3 days. Denies SOB, C/P     Chief complaint:  Nausea vomiting and weakness    History of present illness:  40-year-old female complains of onset abdominal discomfort 2 days ago.  Also complains of nausea vomiting.  Last vomited just prior to arrival.  Severity of symptoms is mild-to-moderate.  No complaints of diarrhea.  She complains of location of the abdominal pain in the supraumbilical epigastric region.  She denies any diarrhea.  No complaints of fever or chills.  States she has a history of liver failure and kidney failure secondary to previous issues with alcoholism.  Nausea vomiting aggravated by oral intake.  No ameliorating factors.  No medications taken prior to arrival.        Review of patient's allergies indicates:  No Known Allergies  Past Medical History:   Diagnosis Date    Hepatitis     HEP C    Seizures      Past Surgical History:   Procedure Laterality Date     SECTION      x 8     SECTION      HERNIA REPAIR      TONSILLECTOMY       History reviewed. No pertinent family history.  Social History     Tobacco Use    Smoking status: Current Every Day Smoker     Packs/day: 0.25     Types: Cigarettes    Smokeless tobacco: Never Used   Substance Use Topics    Alcohol use: Yes    Drug use: Yes     Types: Marijuana     Comment: LAST TIME "A COUPLE DAYS AGO"     Review of Systems   Constitutional: Positive for activity change, appetite change and fatigue. Negative for chills, diaphoresis and fever.   HENT: Negative.    Eyes: Negative.    Respiratory: Negative.  Negative for chest tightness and shortness of breath.    Cardiovascular: Negative.  Negative for chest pain and palpitations.   Gastrointestinal: Positive for abdominal pain, nausea and vomiting. Negative for abdominal distention and diarrhea.   Endocrine: Negative.  "   Genitourinary: Positive for difficulty urinating. Negative for dysuria, flank pain and frequency.   Musculoskeletal: Positive for arthralgias and myalgias. Negative for gait problem, neck pain and neck stiffness.   Skin: Negative.    Neurological: Positive for weakness (Generalized).   Psychiatric/Behavioral: Negative for agitation. The patient is nervous/anxious.        Physical Exam     Initial Vitals [06/29/20 2200]   BP Pulse Resp Temp SpO2   108/69 108 18 (!) 100.8 °F (38.2 °C) 97 %      MAP       --         Physical Exam    Nursing note and vitals reviewed.  Constitutional: She appears well-nourished. She appears distressed (Secondary to nausea and discomfort).   HENT:   Head: Normocephalic and atraumatic.   Right Ear: External ear normal.   Left Ear: External ear normal.   Nose: Nose normal.   Mouth/Throat: Oropharyngeal exudate present.   Eyes: Conjunctivae and EOM are normal. Pupils are equal, round, and reactive to light.   Neck: Normal range of motion. Neck supple. No JVD present.   Cardiovascular: Regular rhythm, intact distal pulses and normal pulses. Tachycardia present.    Pulmonary/Chest: Breath sounds normal. No respiratory distress. She has no wheezes. She has no rhonchi. She has no rales.   Abdominal: Soft. She exhibits no distension and no mass. There is abdominal tenderness ( epigastric). There is no rebound and no guarding.   Musculoskeletal: Normal range of motion.   Lymphadenopathy:     She has no cervical adenopathy.   Neurological: She is alert and oriented to person, place, and time. She has normal strength.   Skin: Skin is warm and dry.   Psychiatric: Her speech is normal and behavior is normal. Judgment and thought content normal. Her mood appears anxious. Cognition and memory are normal.         ED Course   Procedures  Labs Reviewed   SARS-COV-2 RNA AMPLIFICATION, QUAL          Imaging Results    None       X-Rays:   Independently Interpreted Readings:   Chest X-Ray: Normal heart  size.  No infiltrates.  No acute abnormalities.     Medical Decision Making:   Clinical Tests:   Lab Tests: Ordered and Reviewed  The following lab test(s) were unremarkable: CBC, CMP and Urinalysis  ED Management:  0030: Pt with continued c/o abdominal discomfort. Abdomen re-assessed and very soft with no peritoneal signs. Meds given and relief obtained. Subjective complaints and objective findings weighed and patient does NOT have signs of acute abdomen. Probable gastritis vs. Viral illness with low grade fever. Pain is predominantly epigastric with no rebound or guarding.NO lower quadrant rtenderness. Pt able to empty her bladder after IV fluids administered. Overall condition has improved.                                 Clinical Impression:               ICD-10-CM ICD-9-CM   1. Non-intractable vomiting with nausea, unspecified vomiting type  R11.2 787.01   2. Fever  R50.9 780.60   3. Viral illness  B34.9 079.99          Cesar Seymour MD  07/01/20 0404

## 2020-07-05 LAB
BACTERIA BLD CULT: NORMAL
BACTERIA BLD CULT: NORMAL

## 2020-10-05 ENCOUNTER — HOSPITAL ENCOUNTER (EMERGENCY)
Facility: HOSPITAL | Age: 40
Discharge: HOME | End: 2020-10-05
Attending: EMERGENCY MEDICINE
Payer: MEDICAID

## 2020-10-05 VITALS
HEIGHT: 59 IN | RESPIRATION RATE: 19 BRPM | WEIGHT: 200.81 LBS | SYSTOLIC BLOOD PRESSURE: 114 MMHG | OXYGEN SATURATION: 98 % | DIASTOLIC BLOOD PRESSURE: 70 MMHG | HEART RATE: 78 BPM | TEMPERATURE: 99 F | BODY MASS INDEX: 40.48 KG/M2

## 2020-10-05 DIAGNOSIS — R53.1 WEAKNESS: ICD-10-CM

## 2020-10-05 DIAGNOSIS — F15.10 AMPHETAMINE ABUSE: ICD-10-CM

## 2020-10-05 DIAGNOSIS — B18.2 CHRONIC HEPATITIS C WITHOUT HEPATIC COMA: ICD-10-CM

## 2020-10-05 DIAGNOSIS — K70.10 ALCOHOLIC HEPATITIS, UNSPECIFIED WHETHER ASCITES PRESENT: Primary | ICD-10-CM

## 2020-10-05 DIAGNOSIS — F10.10 ALCOHOL ABUSE: ICD-10-CM

## 2020-10-05 LAB
ALBUMIN SERPL BCP-MCNC: 2.4 G/DL (ref 3.5–5.2)
ALP SERPL-CCNC: 565 U/L (ref 55–135)
ALT SERPL W/O P-5'-P-CCNC: 935 U/L (ref 10–44)
AMMONIA PLAS-SCNC: 92 UMOL/L (ref 10–50)
AMORPH CRY UR QL COMP ASSIST: ABNORMAL
AMPHET+METHAMPHET UR QL: NORMAL
AMPHET+METHAMPHET UR QL: NORMAL
AMYLASE SERPL-CCNC: 26 U/L (ref 20–110)
ANION GAP SERPL CALC-SCNC: 10 MMOL/L (ref 8–16)
APAP SERPL-MCNC: <3 UG/ML (ref 10–20)
APTT BLDCRRT: 32.8 SEC (ref 21–32)
AST SERPL-CCNC: 692 U/L (ref 10–40)
B-HCG UR QL: NEGATIVE
BACTERIA #/AREA URNS AUTO: ABNORMAL /HPF
BARBITURATES UR QL SCN>200 NG/ML: NEGATIVE
BARBITURATES UR QL SCN>200 NG/ML: NEGATIVE
BASOPHILS # BLD AUTO: 0.06 K/UL (ref 0–0.2)
BASOPHILS NFR BLD: 1 % (ref 0–1.9)
BENZODIAZ UR QL SCN>200 NG/ML: NEGATIVE
BENZODIAZ UR QL SCN>200 NG/ML: NEGATIVE
BILIRUB SERPL-MCNC: 8.1 MG/DL (ref 0.1–1)
BILIRUB UR QL STRIP: ABNORMAL
BUN SERPL-MCNC: 8 MG/DL (ref 6–20)
BZE UR QL SCN: NEGATIVE
BZE UR QL SCN: NEGATIVE
CALCIUM SERPL-MCNC: 8.5 MG/DL (ref 8.7–10.5)
CANNABINOIDS UR QL SCN: NORMAL
CANNABINOIDS UR QL SCN: NORMAL
CAOX CRY UR QL COMP ASSIST: ABNORMAL
CHLORIDE SERPL-SCNC: 100 MMOL/L (ref 95–110)
CLARITY UR REFRACT.AUTO: ABNORMAL
CO2 SERPL-SCNC: 22 MMOL/L (ref 23–29)
COLOR UR AUTO: ABNORMAL
CREAT SERPL-MCNC: 0.7 MG/DL (ref 0.5–1.4)
CREAT UR-MCNC: 267.6 MG/DL (ref 15–325)
CREAT UR-MCNC: 267.6 MG/DL (ref 15–325)
DIFFERENTIAL METHOD: ABNORMAL
EOSINOPHIL # BLD AUTO: 0 K/UL (ref 0–0.5)
EOSINOPHIL NFR BLD: 0.3 % (ref 0–8)
ERYTHROCYTE [DISTWIDTH] IN BLOOD BY AUTOMATED COUNT: 21.2 % (ref 11.5–14.5)
EST. GFR  (AFRICAN AMERICAN): >60 ML/MIN/1.73 M^2
EST. GFR  (NON AFRICAN AMERICAN): >60 ML/MIN/1.73 M^2
ETHANOL SERPL-MCNC: <10 MG/DL
GLUCOSE SERPL-MCNC: 77 MG/DL (ref 70–110)
GLUCOSE UR QL STRIP: ABNORMAL
HCT VFR BLD AUTO: 35.7 % (ref 37–48.5)
HGB BLD-MCNC: 11.5 G/DL (ref 12–16)
HGB UR QL STRIP: ABNORMAL
HYALINE CASTS UR QL AUTO: 0 /LPF
IMM GRANULOCYTES # BLD AUTO: 0.1 K/UL (ref 0–0.04)
IMM GRANULOCYTES NFR BLD AUTO: 1.7 % (ref 0–0.5)
INR PPP: 1.1 (ref 0.8–1.2)
KETONES UR QL STRIP: ABNORMAL
LEUKOCYTE ESTERASE UR QL STRIP: ABNORMAL
LIPASE SERPL-CCNC: 28 U/L (ref 4–60)
LYMPHOCYTES # BLD AUTO: 2.9 K/UL (ref 1–4.8)
LYMPHOCYTES NFR BLD: 50.4 % (ref 18–48)
MAGNESIUM SERPL-MCNC: 1.5 MG/DL (ref 1.6–2.6)
MCH RBC QN AUTO: 22.9 PG (ref 27–31)
MCHC RBC AUTO-ENTMCNC: 32.2 G/DL (ref 32–36)
MCV RBC AUTO: 71 FL (ref 82–98)
METHADONE UR QL SCN>300 NG/ML: NEGATIVE
METHADONE UR QL SCN>300 NG/ML: NEGATIVE
MICROSCOPIC COMMENT: ABNORMAL
MONOCYTES # BLD AUTO: 0.6 K/UL (ref 0.3–1)
MONOCYTES NFR BLD: 10.9 % (ref 4–15)
NEUTROPHILS # BLD AUTO: 2.1 K/UL (ref 1.8–7.7)
NEUTROPHILS NFR BLD: 35.7 % (ref 38–73)
NITRITE UR QL STRIP: ABNORMAL
NRBC BLD-RTO: 0 /100 WBC
OPIATES UR QL SCN: NEGATIVE
OPIATES UR QL SCN: NEGATIVE
PCP UR QL SCN>25 NG/ML: NEGATIVE
PCP UR QL SCN>25 NG/ML: NEGATIVE
PH UR STRIP: ABNORMAL [PH] (ref 5–8)
PLATELET # BLD AUTO: 296 K/UL (ref 150–350)
PMV BLD AUTO: 10.9 FL (ref 9.2–12.9)
POTASSIUM SERPL-SCNC: 3.6 MMOL/L (ref 3.5–5.1)
PROT SERPL-MCNC: 7.1 G/DL (ref 6–8.4)
PROT UR QL STRIP: ABNORMAL
PROTHROMBIN TIME: 11.6 SEC (ref 9–12.5)
RBC # BLD AUTO: 5.02 M/UL (ref 4–5.4)
RBC #/AREA URNS AUTO: 0 /HPF (ref 0–4)
SARS-COV-2 RDRP RESP QL NAA+PROBE: NEGATIVE
SODIUM SERPL-SCNC: 132 MMOL/L (ref 136–145)
SP GR UR STRIP: ABNORMAL (ref 1–1.03)
SQUAMOUS #/AREA URNS AUTO: 1 /HPF
TOXICOLOGY INFORMATION: NORMAL
TOXICOLOGY INFORMATION: NORMAL
URN SPEC COLLECT METH UR: ABNORMAL
UROBILINOGEN UR STRIP-ACNC: ABNORMAL EU/DL
WBC # BLD AUTO: 5.79 K/UL (ref 3.9–12.7)
WBC #/AREA URNS AUTO: 0 /HPF (ref 0–5)

## 2020-10-05 PROCEDURE — U0002 COVID-19 LAB TEST NON-CDC: HCPCS | Mod: ER

## 2020-10-05 PROCEDURE — 96365 THER/PROPH/DIAG IV INF INIT: CPT | Mod: ER

## 2020-10-05 PROCEDURE — 93005 ELECTROCARDIOGRAM TRACING: CPT | Mod: ER

## 2020-10-05 PROCEDURE — 83690 ASSAY OF LIPASE: CPT | Mod: ER

## 2020-10-05 PROCEDURE — 80320 DRUG SCREEN QUANTALCOHOLS: CPT | Mod: ER

## 2020-10-05 PROCEDURE — 85730 THROMBOPLASTIN TIME PARTIAL: CPT | Mod: ER

## 2020-10-05 PROCEDURE — 80053 COMPREHEN METABOLIC PANEL: CPT | Mod: ER

## 2020-10-05 PROCEDURE — 93010 ELECTROCARDIOGRAM REPORT: CPT | Mod: ,,, | Performed by: INTERNAL MEDICINE

## 2020-10-05 PROCEDURE — 81000 URINALYSIS NONAUTO W/SCOPE: CPT | Mod: 59,ER

## 2020-10-05 PROCEDURE — 63600175 PHARM REV CODE 636 W HCPCS: Mod: ER | Performed by: EMERGENCY MEDICINE

## 2020-10-05 PROCEDURE — 82140 ASSAY OF AMMONIA: CPT | Mod: ER

## 2020-10-05 PROCEDURE — 25000003 PHARM REV CODE 250: Mod: ER | Performed by: EMERGENCY MEDICINE

## 2020-10-05 PROCEDURE — 96368 THER/DIAG CONCURRENT INF: CPT | Mod: ER

## 2020-10-05 PROCEDURE — 99285 EMERGENCY DEPT VISIT HI MDM: CPT | Mod: 25,ER

## 2020-10-05 PROCEDURE — 85610 PROTHROMBIN TIME: CPT | Mod: ER

## 2020-10-05 PROCEDURE — 83735 ASSAY OF MAGNESIUM: CPT | Mod: ER

## 2020-10-05 PROCEDURE — 82150 ASSAY OF AMYLASE: CPT | Mod: ER

## 2020-10-05 PROCEDURE — 80307 DRUG TEST PRSMV CHEM ANLYZR: CPT | Mod: ER

## 2020-10-05 PROCEDURE — 93010 EKG 12-LEAD: ICD-10-PCS | Mod: ,,, | Performed by: INTERNAL MEDICINE

## 2020-10-05 PROCEDURE — 85025 COMPLETE CBC W/AUTO DIFF WBC: CPT | Mod: ER

## 2020-10-05 PROCEDURE — 80329 ANALGESICS NON-OPIOID 1 OR 2: CPT | Mod: ER

## 2020-10-05 PROCEDURE — 81025 URINE PREGNANCY TEST: CPT | Mod: ER

## 2020-10-05 RX ADMIN — ASCORBIC ACID, VITAMIN A PALMITATE, CHOLECALCIFEROL, THIAMINE HYDROCHLORIDE, RIBOFLAVIN-5 PHOSPHATE SODIUM, PYRIDOXINE HYDROCHLORIDE, NIACINAMIDE, DEXPANTHENOL, ALPHA-TOCOPHEROL ACETATE, VITAMIN K1, FOLIC ACID, BIOTIN, CYANOCOBALAMIN: 200; 3300; 200; 6; 3.6; 6; 40; 15; 10; 150; 600; 60; 5 INJECTION, SOLUTION INTRAVENOUS at 04:10

## 2020-10-05 RX ADMIN — THIAMINE HYDROCHLORIDE 100 MG: 100 INJECTION, SOLUTION INTRAMUSCULAR; INTRAVENOUS at 04:10

## 2020-10-05 RX ADMIN — FOLIC ACID 1 MG: 5 INJECTION, SOLUTION INTRAMUSCULAR; INTRAVENOUS; SUBCUTANEOUS at 04:10

## 2020-10-05 NOTE — DISCHARGE INSTRUCTIONS
No alcohol.  No marijuana or drugs of any kind.  Return here tomorrow at 8 in the morning to recheck labs and make further plans.    Your liver is not doing well and we will have to follow it closely.     Your kidney function is fine.    Bring information about your primary care and hepatitis doctors tomorrow for further follow up arrangements.

## 2020-10-05 NOTE — ED PROVIDER NOTES
Encounter Date: 10/5/2020       History     Chief Complaint   Patient presents with    Abdominal Pain     Pt complains of pressure pain in the left lower quad of abd, yellow eyes, peeing orange/tan color, N & V, all started 2 days ago, Pt dx with Hep C      She has a history liver failure with hepatitis C and alcohol abuse, reports that she was on treatment for hepatitis-C in [place] but missed the last 3 weeks of her treatment course due to the coronavirus pandemic.  She has continued drinking alcohol heavily, straight vodka until 1 week ago at which time she had been tapering and quit altogether.  She denies drug use other than marijuana; denies history of IV drug use.  She has been without primary care or other follow-up for some time now; her most recent laboratory studies were  of this year.  She reports that she has recently gotten her Medicaid reactivated but does not have a primary care provider yet.  She does smoke cigarettes.  She is here now for worsening jaundice, icterus darkening the urine with diffuse abdominal pressure some intermittent nausea and vomiting all over the last 2 or 3 days.  She feels generally tired, diffusely weak, and has non localizing abdominal and back pain.  No dysuria, fever, chills, sweats, chest pain, or cough.  She has not had known coronavirus exposure and tested negative for COVID-19 about 3 months ago.  Denies taking over-the-counter medications and in particular denies acetaminophen or nonsteroidal anti-inflammatory medications.  Denies prescription medicine.  Feels her mental status is stable and she has had no confusion.  No other localizing complaints.    The history is provided by the patient. No  was used.     Review of patient's allergies indicates:  No Known Allergies  Past Medical History:   Diagnosis Date    Hepatitis     HEP C    Seizures      Past Surgical History:   Procedure Laterality Date     SECTION      x 8     " SECTION      HERNIA REPAIR      TONSILLECTOMY       History reviewed. No pertinent family history.  Social History     Tobacco Use    Smoking status: Current Every Day Smoker     Packs/day: 0.25     Types: Cigarettes    Smokeless tobacco: Never Used   Substance Use Topics    Alcohol use: Not Currently     Comment: stopped 1 week ago; before "drinking heavily"     Drug use: Yes     Types: Marijuana     Comment: LAST TIME "A COUPLE DAYS AGO"     Review of Systems   Constitutional: Positive for appetite change and fatigue. Negative for activity change and fever.   HENT: Negative for congestion, ear pain, facial swelling, nosebleeds, sinus pressure and sore throat.    Eyes: Negative for pain, discharge, redness and visual disturbance.   Respiratory: Negative for cough, choking, chest tightness, shortness of breath and wheezing.    Cardiovascular: Negative for chest pain, palpitations and leg swelling.   Gastrointestinal: Positive for abdominal pain. Negative for abdominal distention, nausea and vomiting.   Endocrine: Negative for heat intolerance, polydipsia and polyuria.   Genitourinary: Negative for difficulty urinating, dysuria, flank pain, hematuria and urgency.   Musculoskeletal: Positive for back pain. Negative for gait problem, joint swelling and myalgias.   Skin: Negative for color change and rash.   Allergic/Immunologic: Negative for environmental allergies and food allergies.   Neurological: Positive for weakness. Negative for dizziness, numbness and headaches.   Hematological: Negative for adenopathy. Does not bruise/bleed easily.   Psychiatric/Behavioral: Negative for agitation and behavioral problems. The patient is not nervous/anxious.    All other systems reviewed and are negative.      Physical Exam     Initial Vitals [10/05/20 1453]   BP Pulse Resp Temp SpO2   102/62 84 18 98.9 °F (37.2 °C) 98 %      MAP       --         Physical Exam    Nursing note and vitals reviewed.  Constitutional: " She appears well-developed and well-nourished. She is not diaphoretic. No distress.   Obese/ Chronically ill-appearing, mildly uncomfortable but no acute distress   HENT:   Head: Normocephalic and atraumatic.   Mouth/Throat: No oropharyngeal exudate.   Eyes: Conjunctivae and EOM are normal. Pupils are equal, round, and reactive to light. Right eye exhibits no discharge. Left eye exhibits no discharge. Scleral icterus is present.   Neck: Normal range of motion. Neck supple. No thyromegaly present. No tracheal deviation present. No JVD present.   Cardiovascular: Normal rate, regular rhythm, normal heart sounds and intact distal pulses. Exam reveals no gallop and no friction rub.    No murmur heard.  Pulmonary/Chest: Breath sounds normal. No stridor. No respiratory distress. She has no wheezes. She has no rhonchi. She has no rales. She exhibits no tenderness.   Abdominal: Soft. Bowel sounds are normal. She exhibits no distension and no mass. There is no abdominal tenderness. There is no rebound and no guarding.   Obese/ well healed surgical scars; soft, no localizing tenderness; ascites is likely   Musculoskeletal: Normal range of motion. No tenderness or edema.   Neurological: She is alert and oriented to person, place, and time. She has normal strength.   Skin: Skin is warm and dry. No rash and no abscess noted. No erythema.   Jaundiced   Psychiatric: She has a normal mood and affect. Her behavior is normal. Judgment and thought content normal.   Anxious but appropriate; no sign of encephalopathy         ED Course   Procedures  Labs Reviewed   CBC W/ AUTO DIFFERENTIAL - Abnormal; Notable for the following components:       Result Value    Hemoglobin 11.5 (*)     Hematocrit 35.7 (*)     Mean Corpuscular Volume 71 (*)     Mean Corpuscular Hemoglobin 22.9 (*)     RDW 21.2 (*)     Immature Granulocytes 1.7 (*)     Immature Grans (Abs) 0.10 (*)     Gran% 35.7 (*)     Lymph% 50.4 (*)     All other components within normal  limits   COMPREHENSIVE METABOLIC PANEL - Abnormal; Notable for the following components:    Sodium 132 (*)     CO2 22 (*)     Calcium 8.5 (*)     Albumin 2.4 (*)     Total Bilirubin 8.1 (*)     Alkaline Phosphatase 565 (*)      (*)      (*)     All other components within normal limits   ACETAMINOPHEN LEVEL - Abnormal; Notable for the following components:    Acetaminophen (Tylenol), Serum <3.0 (*)     All other components within normal limits   APTT - Abnormal; Notable for the following components:    aPTT 32.8 (*)     All other components within normal limits   AMMONIA - Abnormal; Notable for the following components:    Ammonia 92 (*)     All other components within normal limits   MAGNESIUM - Abnormal; Notable for the following components:    Magnesium 1.5 (*)     All other components within normal limits   DRUG SCREEN PANEL, URINE EMERGENCY    Narrative:     Specimen Source->Urine   ALCOHOL,MEDICAL (ETHANOL)   PREGNANCY TEST, URINE RAPID    Narrative:     Specimen Source->Urine   PROTIME-INR   AMYLASE   LIPASE   DRUG SCREEN PANEL, URINE EMERGENCY    Narrative:     Specimen Source->Urine   SARS-COV-2 RNA AMPLIFICATION, QUAL   URINALYSIS, REFLEX TO URINE CULTURE     EKG Readings: (Independently Interpreted)   Initial Reading: No STEMI. Rhythm: Normal Sinus Rhythm. Heart Rate: 77. Ectopy: No Ectopy. Conduction: Normal. ST Segments: Normal ST Segments. T Waves: Normal. Axis: Normal. Clinical Impression: Normal Sinus Rhythm     ECG Results          EKG 12-lead (In process)  Result time 10/05/20 16:05:09    In process by Interface, Lab In [facility] (10/05/20 16:05:09)                 Narrative:    Test Reason : R53.1,    Vent. Rate : 077 BPM     Atrial Rate : 077 BPM     P-R Int : 140 ms          QRS Dur : 090 ms      QT Int : 380 ms       P-R-T Axes : 008 048 052 degrees     QTc Int : 430 ms    Normal sinus rhythm  Normal ECG  When compared with ECG of 03-JUL-2018 13:12,  No significant change was  found    Referred By: [email or web address]   SELF           Confirmed By:                             Imaging Results          X-Ray Abdomen Flat And Erect (Final result)  Result time 10/05/20 17:28:04    Final result by Anders Devlin MD (10/05/20 17:28:04)                 Impression:      Nonobstructive small bowel gas pattern.      Electronically signed by: Anders Devlin  Date:    10/05/2020  Time:    17:28             Narrative:    EXAMINATION:  XR ABDOMEN FLAT AND ERECT    CLINICAL HISTORY:  abdominal pain;    TECHNIQUE:  Flat and erect AP views of the abdomen were performed.    COMPARISON:  07/03/2018    FINDINGS:  Lung bases are clear.    Small bowel gas pattern appears unremarkable.  Colon appears unremarkable.  No renal calculi definitely seen.    Osseous structures grossly intact.                               X-Ray Chest AP Portable (Final result)  Result time 10/05/20 17:14:49    Final result by Anders Devlin MD (10/05/20 17:14:49)                 Impression:      No acute abnormality.      Electronically signed by: Anders Devlin  Date:    10/05/2020  Time:    17:14             Narrative:    EXAMINATION:  XR CHEST AP PORTABLE    CLINICAL HISTORY:  Chest Pain;    TECHNIQUE:  Single frontal view of the chest was performed.    COMPARISON:  06/29/2020.    FINDINGS:  The lungs are clear, with normal appearance of pulmonary vasculature and no pleural effusion or pneumothorax.    The cardiac silhouette is normal in size. The hilar and mediastinal contours are unremarkable.    Bones are intact.                                5:56 PM   Resting comfortably, no change in mental status, no new complaints.  She denies any intentional amphetamine use of any kind, thinks it may be contaminated marijuana.  Discussed briefly Dr. Chanel Reyes on-call for Gastroenterology, discussed with patient in detail.  She does not have any admittable criteria, and present decline in hepatic function is likely due to recent alcohol  abuse.  She is counseled to strictly avoid alcohol and drugs and to return here in the morning so that I may recheck her for trends in liver function tests and make further follow-up arrangements.  She is stable for outpatient management and agreeable to this plan.  I will see her here at 8:00 in the morning.                                   Clinical Impression:     ICD-10-CM ICD-9-CM   1. Alcoholic hepatitis, unspecified whether ascites present  K70.10 571.1   2. Weakness  R53.1 780.79   3. Chronic hepatitis C without hepatic coma  B18.2 070.54   4. Alcohol abuse  F10.10 305.00   5. Amphetamine abuse  F15.10 305.70                      Disposition:   Disposition: Discharged  Condition: Stable     ED Disposition Condition    Discharge Stable        ED Prescriptions     None        Follow-up Information     Follow up With Specialties Details Why Contact Info    Von Rios MD Pediatrics Schedule an appointment as soon as possible for a visit   [address] [facility] [address]  [phone]      [facility] Emergency Medicine In 1 day  [address] [address] [id number] [phone]                                       Rudy Franco MD  10/05/20 [id number]